# Patient Record
Sex: MALE | Employment: FULL TIME | URBAN - METROPOLITAN AREA
[De-identification: names, ages, dates, MRNs, and addresses within clinical notes are randomized per-mention and may not be internally consistent; named-entity substitution may affect disease eponyms.]

---

## 2021-01-30 LAB — SARS-COV-2: POSITIVE

## 2021-02-20 ENCOUNTER — APPOINTMENT (OUTPATIENT)
Dept: GENERAL RADIOLOGY | Age: 51
DRG: 394 | End: 2021-02-20
Payer: COMMERCIAL

## 2021-02-20 ENCOUNTER — HOSPITAL ENCOUNTER (INPATIENT)
Age: 51
LOS: 2 days | Discharge: ANOTHER HOSPITAL | DRG: 394 | End: 2021-02-23
Attending: EMERGENCY MEDICINE | Admitting: INTERNAL MEDICINE
Payer: COMMERCIAL

## 2021-02-20 DIAGNOSIS — K92.2 LOWER GI BLEED: Primary | ICD-10-CM

## 2021-02-20 DIAGNOSIS — D62 ANEMIA DUE TO ACUTE BLOOD LOSS: ICD-10-CM

## 2021-02-20 LAB
A/G RATIO: 1.7 (ref 1.1–2.2)
ALBUMIN SERPL-MCNC: 4 G/DL (ref 3.4–5)
ALP BLD-CCNC: 51 U/L (ref 40–129)
ALT SERPL-CCNC: 20 U/L (ref 10–40)
ANION GAP SERPL CALCULATED.3IONS-SCNC: 7 MMOL/L (ref 3–16)
AST SERPL-CCNC: 19 U/L (ref 15–37)
BASOPHILS ABSOLUTE: 0.1 K/UL (ref 0–0.2)
BASOPHILS RELATIVE PERCENT: 1.7 %
BILIRUB SERPL-MCNC: 0.3 MG/DL (ref 0–1)
BUN BLDV-MCNC: 17 MG/DL (ref 7–20)
CALCIUM SERPL-MCNC: 9.1 MG/DL (ref 8.3–10.6)
CHLORIDE BLD-SCNC: 106 MMOL/L (ref 99–110)
CO2: 28 MMOL/L (ref 21–32)
CREAT SERPL-MCNC: 1 MG/DL (ref 0.9–1.3)
D DIMER: <200 NG/ML DDU (ref 0–229)
EOSINOPHILS ABSOLUTE: 0.2 K/UL (ref 0–0.6)
EOSINOPHILS RELATIVE PERCENT: 3.8 %
GFR AFRICAN AMERICAN: >60
GFR NON-AFRICAN AMERICAN: >60
GLOBULIN: 2.4 G/DL
GLUCOSE BLD-MCNC: 120 MG/DL (ref 70–99)
HCT VFR BLD CALC: 22.2 % (ref 40.5–52.5)
HEMOGLOBIN: 7.5 G/DL (ref 13.5–17.5)
LYMPHOCYTES ABSOLUTE: 1.4 K/UL (ref 1–5.1)
LYMPHOCYTES RELATIVE PERCENT: 28 %
MCH RBC QN AUTO: 28.6 PG (ref 26–34)
MCHC RBC AUTO-ENTMCNC: 33.7 G/DL (ref 31–36)
MCV RBC AUTO: 84.7 FL (ref 80–100)
MONOCYTES ABSOLUTE: 0.4 K/UL (ref 0–1.3)
MONOCYTES RELATIVE PERCENT: 8.4 %
NEUTROPHILS ABSOLUTE: 2.9 K/UL (ref 1.7–7.7)
NEUTROPHILS RELATIVE PERCENT: 58.1 %
PDW BLD-RTO: 15.7 % (ref 12.4–15.4)
PLATELET # BLD: 285 K/UL (ref 135–450)
PMV BLD AUTO: 7 FL (ref 5–10.5)
POTASSIUM REFLEX MAGNESIUM: 3.8 MMOL/L (ref 3.5–5.1)
PRO-BNP: <5 PG/ML (ref 0–124)
RBC # BLD: 2.62 M/UL (ref 4.2–5.9)
SODIUM BLD-SCNC: 141 MMOL/L (ref 136–145)
TOTAL PROTEIN: 6.4 G/DL (ref 6.4–8.2)
TROPONIN: <0.01 NG/ML
WBC # BLD: 5 K/UL (ref 4–11)

## 2021-02-20 PROCEDURE — 85025 COMPLETE CBC W/AUTO DIFF WBC: CPT

## 2021-02-20 PROCEDURE — 93005 ELECTROCARDIOGRAM TRACING: CPT | Performed by: PHYSICIAN ASSISTANT

## 2021-02-20 PROCEDURE — 71045 X-RAY EXAM CHEST 1 VIEW: CPT

## 2021-02-20 PROCEDURE — 83880 ASSAY OF NATRIURETIC PEPTIDE: CPT

## 2021-02-20 PROCEDURE — 85379 FIBRIN DEGRADATION QUANT: CPT

## 2021-02-20 PROCEDURE — 80053 COMPREHEN METABOLIC PANEL: CPT

## 2021-02-20 PROCEDURE — 85610 PROTHROMBIN TIME: CPT

## 2021-02-20 PROCEDURE — 84484 ASSAY OF TROPONIN QUANT: CPT

## 2021-02-20 PROCEDURE — 85730 THROMBOPLASTIN TIME PARTIAL: CPT

## 2021-02-20 PROCEDURE — 99285 EMERGENCY DEPT VISIT HI MDM: CPT

## 2021-02-20 SDOH — HEALTH STABILITY: MENTAL HEALTH: HOW OFTEN DO YOU HAVE A DRINK CONTAINING ALCOHOL?: NEVER

## 2021-02-20 ASSESSMENT — PAIN SCALES - GENERAL: PAINLEVEL_OUTOF10: 1

## 2021-02-20 ASSESSMENT — PAIN DESCRIPTION - LOCATION: LOCATION: HEAD

## 2021-02-21 PROBLEM — K62.5 BRBPR (BRIGHT RED BLOOD PER RECTUM): Status: ACTIVE | Noted: 2021-02-21

## 2021-02-21 LAB
A/G RATIO: 1.9 (ref 1.1–2.2)
ABO/RH: NORMAL
ALBUMIN SERPL-MCNC: 4 G/DL (ref 3.4–5)
ALP BLD-CCNC: 50 U/L (ref 40–129)
ALT SERPL-CCNC: 21 U/L (ref 10–40)
ANION GAP SERPL CALCULATED.3IONS-SCNC: 6 MMOL/L (ref 3–16)
ANTIBODY SCREEN: NORMAL
APTT: 27 SEC (ref 24.2–36.2)
AST SERPL-CCNC: 19 U/L (ref 15–37)
BASOPHILS ABSOLUTE: 0 K/UL (ref 0–0.2)
BASOPHILS RELATIVE PERCENT: 0.7 %
BILIRUB SERPL-MCNC: 0.5 MG/DL (ref 0–1)
BLOOD BANK DISPENSE STATUS: NORMAL
BLOOD BANK PRODUCT CODE: NORMAL
BPU ID: NORMAL
BUN BLDV-MCNC: 15 MG/DL (ref 7–20)
CALCIUM SERPL-MCNC: 8.8 MG/DL (ref 8.3–10.6)
CHLORIDE BLD-SCNC: 111 MMOL/L (ref 99–110)
CO2: 26 MMOL/L (ref 21–32)
CREAT SERPL-MCNC: 1 MG/DL (ref 0.9–1.3)
DESCRIPTION BLOOD BANK: NORMAL
EKG ATRIAL RATE: 95 BPM
EKG DIAGNOSIS: NORMAL
EKG P AXIS: 55 DEGREES
EKG P-R INTERVAL: 150 MS
EKG Q-T INTERVAL: 354 MS
EKG QRS DURATION: 112 MS
EKG QTC CALCULATION (BAZETT): 444 MS
EKG R AXIS: -26 DEGREES
EKG T AXIS: 12 DEGREES
EKG VENTRICULAR RATE: 95 BPM
EOSINOPHILS ABSOLUTE: 0.1 K/UL (ref 0–0.6)
EOSINOPHILS RELATIVE PERCENT: 3.7 %
GFR AFRICAN AMERICAN: >60
GFR NON-AFRICAN AMERICAN: >60
GLOBULIN: 2.1 G/DL
GLUCOSE BLD-MCNC: 108 MG/DL (ref 70–99)
HCT VFR BLD CALC: 20.5 % (ref 40.5–52.5)
HCT VFR BLD CALC: 22.2 % (ref 40.5–52.5)
HCT VFR BLD CALC: 23.4 % (ref 40.5–52.5)
HEMOGLOBIN: 7 G/DL (ref 13.5–17.5)
HEMOGLOBIN: 7.4 G/DL (ref 13.5–17.5)
HEMOGLOBIN: 7.9 G/DL (ref 13.5–17.5)
INR BLD: 0.96 (ref 0.86–1.14)
LYMPHOCYTES ABSOLUTE: 1.1 K/UL (ref 1–5.1)
LYMPHOCYTES RELATIVE PERCENT: 28.3 %
MCH RBC QN AUTO: 28 PG (ref 26–34)
MCHC RBC AUTO-ENTMCNC: 33.2 G/DL (ref 31–36)
MCV RBC AUTO: 84.2 FL (ref 80–100)
MONOCYTES ABSOLUTE: 0.4 K/UL (ref 0–1.3)
MONOCYTES RELATIVE PERCENT: 9.3 %
NEUTROPHILS ABSOLUTE: 2.3 K/UL (ref 1.7–7.7)
NEUTROPHILS RELATIVE PERCENT: 58 %
PDW BLD-RTO: 15.5 % (ref 12.4–15.4)
PLATELET # BLD: 253 K/UL (ref 135–450)
PMV BLD AUTO: 6.4 FL (ref 5–10.5)
POTASSIUM REFLEX MAGNESIUM: 4.5 MMOL/L (ref 3.5–5.1)
PROTHROMBIN TIME: 11.1 SEC (ref 10–13.2)
RBC # BLD: 2.64 M/UL (ref 4.2–5.9)
SODIUM BLD-SCNC: 143 MMOL/L (ref 136–145)
TOTAL PROTEIN: 6.1 G/DL (ref 6.4–8.2)
WBC # BLD: 4 K/UL (ref 4–11)

## 2021-02-21 PROCEDURE — 86901 BLOOD TYPING SEROLOGIC RH(D): CPT

## 2021-02-21 PROCEDURE — 83540 ASSAY OF IRON: CPT

## 2021-02-21 PROCEDURE — 93010 ELECTROCARDIOGRAM REPORT: CPT | Performed by: INTERNAL MEDICINE

## 2021-02-21 PROCEDURE — 85018 HEMOGLOBIN: CPT

## 2021-02-21 PROCEDURE — 6370000000 HC RX 637 (ALT 250 FOR IP): Performed by: INTERNAL MEDICINE

## 2021-02-21 PROCEDURE — 36415 COLL VENOUS BLD VENIPUNCTURE: CPT

## 2021-02-21 PROCEDURE — 6360000002 HC RX W HCPCS: Performed by: INTERNAL MEDICINE

## 2021-02-21 PROCEDURE — 2580000003 HC RX 258: Performed by: INTERNAL MEDICINE

## 2021-02-21 PROCEDURE — 86850 RBC ANTIBODY SCREEN: CPT

## 2021-02-21 PROCEDURE — 99254 IP/OBS CNSLTJ NEW/EST MOD 60: CPT | Performed by: INTERNAL MEDICINE

## 2021-02-21 PROCEDURE — 2060000000 HC ICU INTERMEDIATE R&B

## 2021-02-21 PROCEDURE — 83550 IRON BINDING TEST: CPT

## 2021-02-21 PROCEDURE — 86900 BLOOD TYPING SEROLOGIC ABO: CPT

## 2021-02-21 PROCEDURE — 80053 COMPREHEN METABOLIC PANEL: CPT

## 2021-02-21 PROCEDURE — P9016 RBC LEUKOCYTES REDUCED: HCPCS

## 2021-02-21 PROCEDURE — 85025 COMPLETE CBC W/AUTO DIFF WBC: CPT

## 2021-02-21 PROCEDURE — 85014 HEMATOCRIT: CPT

## 2021-02-21 PROCEDURE — 86923 COMPATIBILITY TEST ELECTRIC: CPT

## 2021-02-21 PROCEDURE — 36430 TRANSFUSION BLD/BLD COMPNT: CPT

## 2021-02-21 RX ORDER — ACETAMINOPHEN 325 MG/1
650 TABLET ORAL EVERY 6 HOURS PRN
Status: DISCONTINUED | OUTPATIENT
Start: 2021-02-21 | End: 2021-02-23 | Stop reason: HOSPADM

## 2021-02-21 RX ORDER — CALCIUM CARBONATE/VITAMIN D3 600 MG-10
TABLET ORAL
COMMUNITY

## 2021-02-21 RX ORDER — SODIUM CHLORIDE 9 MG/ML
INJECTION, SOLUTION INTRAVENOUS CONTINUOUS
Status: DISCONTINUED | OUTPATIENT
Start: 2021-02-21 | End: 2021-02-23 | Stop reason: HOSPADM

## 2021-02-21 RX ORDER — MULTIVIT WITH MINERALS/LUTEIN
1000 TABLET ORAL DAILY
COMMUNITY

## 2021-02-21 RX ORDER — ACETAMINOPHEN 650 MG/1
650 SUPPOSITORY RECTAL EVERY 6 HOURS PRN
Status: DISCONTINUED | OUTPATIENT
Start: 2021-02-21 | End: 2021-02-23 | Stop reason: HOSPADM

## 2021-02-21 RX ORDER — DIAPER,BRIEF,INFANT-TODD,DISP
EACH MISCELLANEOUS 2 TIMES DAILY
Status: ON HOLD | COMMUNITY
End: 2021-02-26 | Stop reason: HOSPADM

## 2021-02-21 RX ORDER — SODIUM CHLORIDE 0.9 % (FLUSH) 0.9 %
10 SYRINGE (ML) INJECTION EVERY 12 HOURS SCHEDULED
Status: DISCONTINUED | OUTPATIENT
Start: 2021-02-21 | End: 2021-02-23 | Stop reason: HOSPADM

## 2021-02-21 RX ORDER — SODIUM CHLORIDE 0.9 % (FLUSH) 0.9 %
10 SYRINGE (ML) INJECTION PRN
Status: DISCONTINUED | OUTPATIENT
Start: 2021-02-21 | End: 2021-02-23 | Stop reason: HOSPADM

## 2021-02-21 RX ORDER — ALBUTEROL SULFATE 1.25 MG/3ML
1 SOLUTION RESPIRATORY (INHALATION) EVERY 4 HOURS PRN
COMMUNITY

## 2021-02-21 RX ORDER — M-VIT,TX,IRON,MINS/CALC/FOLIC 27MG-0.4MG
1 TABLET ORAL DAILY
COMMUNITY

## 2021-02-21 RX ORDER — OCTREOTIDE ACETATE 50 UG/ML
50 INJECTION, SOLUTION INTRAVENOUS; SUBCUTANEOUS ONCE
Status: COMPLETED | OUTPATIENT
Start: 2021-02-21 | End: 2021-02-21

## 2021-02-21 RX ORDER — ONDANSETRON 2 MG/ML
4 INJECTION INTRAMUSCULAR; INTRAVENOUS EVERY 6 HOURS PRN
Status: DISCONTINUED | OUTPATIENT
Start: 2021-02-21 | End: 2021-02-23 | Stop reason: HOSPADM

## 2021-02-21 RX ORDER — PROMETHAZINE HYDROCHLORIDE 25 MG/1
12.5 TABLET ORAL EVERY 6 HOURS PRN
Status: DISCONTINUED | OUTPATIENT
Start: 2021-02-21 | End: 2021-02-23 | Stop reason: HOSPADM

## 2021-02-21 RX ORDER — SODIUM CHLORIDE 9 MG/ML
INJECTION, SOLUTION INTRAVENOUS PRN
Status: DISCONTINUED | OUTPATIENT
Start: 2021-02-21 | End: 2021-02-22 | Stop reason: ALTCHOICE

## 2021-02-21 RX ADMIN — BISACODYL 20 MG: 5 TABLET, COATED ORAL at 12:55

## 2021-02-21 RX ADMIN — SODIUM CHLORIDE: 9 INJECTION, SOLUTION INTRAVENOUS at 16:57

## 2021-02-21 RX ADMIN — OCTREOTIDE ACETATE 50 MCG: 50 INJECTION, SOLUTION INTRAVENOUS; SUBCUTANEOUS at 04:36

## 2021-02-21 RX ADMIN — POLYETHYLENE GLYCOL 3350, SODIUM SULFATE ANHYDROUS, SODIUM BICARBONATE, SODIUM CHLORIDE, POTASSIUM CHLORIDE 4000 ML: 236; 22.74; 6.74; 5.86; 2.97 POWDER, FOR SOLUTION ORAL at 12:55

## 2021-02-21 RX ADMIN — SODIUM CHLORIDE: 9 INJECTION, SOLUTION INTRAVENOUS at 10:40

## 2021-02-21 RX ADMIN — SODIUM CHLORIDE: 9 INJECTION, SOLUTION INTRAVENOUS at 04:22

## 2021-02-21 RX ADMIN — ACETAMINOPHEN 650 MG: 325 TABLET ORAL at 13:08

## 2021-02-21 ASSESSMENT — ENCOUNTER SYMPTOMS
ANAL BLEEDING: 1
EYE PAIN: 0
SORE THROAT: 0
NAUSEA: 0
VOMITING: 0
ABDOMINAL PAIN: 0
COUGH: 0
BACK PAIN: 0
BLOOD IN STOOL: 1
SHORTNESS OF BREATH: 0

## 2021-02-21 ASSESSMENT — PAIN DESCRIPTION - LOCATION: LOCATION: HEAD

## 2021-02-21 ASSESSMENT — PAIN SCALES - GENERAL: PAINLEVEL_OUTOF10: 2

## 2021-02-21 NOTE — PROGRESS NOTES
Pt a/o. Am assessment completed see flow sheet. Pt denies any pain or other needs at this time. BM in toilet with a significant amount of bright red blood. Call light within reach. Will continue to monitor.

## 2021-02-21 NOTE — ED PROVIDER NOTES
[address] ED  EMERGENCY DEPARTMENT ENCOUNTER        Pt Name: Marva Ellington  MRN: [id number]  [place] 1970  Date of evaluation: 2/20/2021  Provider: ALICIA Barrera  PCP: No primary care provider on file. I have seen and evaluated this patient with my supervising physician Danielle Campbell MD.    [address] [place] [address]       Chief Complaint   Patient presents with    Shortness of Breath     C/O [place]. POSITIVE COVID IN [facility], OUT OF QUARANTINE. REPORTS SHORT OF BREATH WHILE WORKING OUT        HISTORY OF PRESENT ILLNESS   (Location, Timing/Onset, Context/Setting, Quality, Duration, Modifying Factors, Severity, Associated Signs and Symptoms)  Note limiting factors. ESPERANZA Martinez is a 53 y.o. male presents the emergency department for shortness of breath. On my initial evaluation of the patient, he noted that he was diagnosed with COVID-19 on January 30 after developing symptoms on January 29. Was cleared to return to work in mid February. Notes that he has been having increasing shortness of breath, decreased exercise capacity and feels short of breath with walking up 1 flight of stairs. 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 He later noted that he had a colonoscopy on January 26 for rectal bleeding, was noted to have internal hemorrhoids was evaluated by colorectal surgery and had banding procedure on January 28. He notes that one of the bands is fallen off, he continues to have rectal bleeding both with stooling and in between episodes of defecation. It is always bright red. Denies melanotic stool. Denies abdominal pain, fever, chest pain, nausea, vomiting, numbness, weakness. Nursing Notes were all reviewed and agreed with or any disagreements were addressed in the HPI. REVIEW OF SYSTEMS    (2-9 systems for level 4, 10 or more for level 5)     Review of Systems   Constitutional: Negative for fever.    HENT: Negative for sore throat. Eyes: Negative for pain and visual disturbance. Respiratory: Negative for cough and shortness of breath. Cardiovascular: Negative for chest pain. Gastrointestinal: Positive for anal bleeding and blood in stool. Negative for abdominal pain, nausea and vomiting. Genitourinary: Negative for dysuria and frequency. Musculoskeletal: Negative for back pain and neck pain. Skin: Negative for rash. Neurological: Negative for weakness, numbness and headaches. Psychiatric/Behavioral: Negative for confusion. Positives and Pertinent negatives as per HPI. Except as noted above in the ROS, all other systems were reviewed and negative. PAST MEDICAL HISTORY     Past Medical History:   Diagnosis Date    Asthma          SURGICAL HISTORY     Past Surgical History:   Procedure Laterality Date    BONY PELVIS SURGERY      HEMORRHOID SURGERY      HIP SURGERY      KNEE SURGERY           CURRENTMEDICATIONS       Previous Medications    No medications on file         ALLERGIES     Patient has no known allergies. FAMILYHISTORY     History reviewed. No pertinent family history. SOCIAL HISTORY       Social History     Tobacco Use    Smoking status: Never Smoker   Substance Use Topics    Alcohol use: Never     Frequency: Never    Drug use: Never       SCREENINGS             PHYSICAL EXAM    (up to 7 for level 4, 8 or more for level 5)     ED Triage Vitals [02/20/21 2123]   BP Temp Temp Source Pulse Resp SpO2 Height Weight   (!) 175/91 99.9 °F (37.7 °C) Oral 99 20 99 % 6' 2\" (1.88 m) 250 lb (113.4 kg)       Physical Exam  Vitals signs reviewed. Constitutional:       Appearance: He is not diaphoretic. HENT:      Nose: No congestion or rhinorrhea. Eyes:      General: No scleral icterus. Conjunctiva/sclera: Conjunctivae normal.   Neck:      Musculoskeletal: Normal range of motion and neck supple. Cardiovascular:      Rate and Rhythm: Normal rate and regular rhythm.       Pulses: Normal pulses. Heart sounds: Normal heart sounds. No murmur. No friction rub. No gallop. Pulmonary:      Effort: Pulmonary effort is normal. No respiratory distress. Breath sounds: Normal breath sounds. No stridor. No wheezing, rhonchi or rales. Abdominal:      General: There is no distension. Palpations: Abdomen is soft. Tenderness: There is no abdominal tenderness. There is no guarding or rebound. Genitourinary:     Rectum: Normal.   Musculoskeletal: Normal range of motion. General: No swelling or tenderness. Skin:     General: Skin is warm and dry. Neurological:      General: No focal deficit present. Mental Status: He is alert and oriented to person, place, and time. Sensory: No sensory deficit. Motor: No weakness.       Gait: Gait normal.   Psychiatric:         Mood and Affect: Mood normal.         Behavior: Behavior normal.         DIAGNOSTIC RESULTS   LABS:    Labs Reviewed   CBC WITH AUTO DIFFERENTIAL - Abnormal; Notable for the following components:       Result Value    RBC 2.62 (*)     Hemoglobin 7.5 (*)     Hematocrit 22.2 (*)     RDW 15.7 (*)     All other components within normal limits    Narrative:     Performed at:  [facility]) [place] [address],  [place], [facility]   Phone [phone]   COMPREHENSIVE METABOLIC PANEL W/ REFLEX TO MG FOR LOW K - Abnormal; Notable for the following components:    Glucose 120 (*)     All other components within normal limits    Narrative:     Performed at:  [place] [address],  [place], [facility]   Phone [phone]   TROPONIN    Narrative:     Performed at:  [address] [address],  [place], [facility]   Phone [phone]   D-DIMER, QUANTITATIVE    Narrative:     Performed at:  [address] [address],  [place], [facility]   Phone [phone] BRAIN NATRIURETIC PEPTIDE    Narrative:     Performed at:  [facility]  [address],  [place], [place]   Phone [phone]       All other labs were within normal range or not returned as of this dictation. EKG: All EKG's are interpreted by the Emergency Department Physician in the absence of a cardiologist.  Please see their note for interpretation of EKG. RADIOLOGY:   Non-plain film images such as CT, Ultrasound and MRI are read by the radiologist. Plain radiographic images are visualized and preliminarily interpreted by the ED Provider with the below findings:        Interpretation per the Radiologist below, if available at the time of this note:    XR CHEST PORTABLE   Final Result      No acute intrathoracic pathology. Xr Chest Portable    Result Date: 2/20/2021  EXAMINATION: ONE XRAY VIEW OF THE CHEST 2/20/2021 10:36 pm COMPARISON: None. HISTORY: ORDERING SYSTEM PROVIDED HISTORY: SOB TECHNOLOGIST PROVIDED HISTORY: Reason for exam:->SOB Reason for Exam: SOB Acuity: Acute Type of Exam: Subsequent/Follow-up Additional signs and symptoms: SOB FINDINGS: The cardiomediastinal silhouette and hilar contours are normal.  8 mm calcified granuloma within the right upper lobe. The lungs are clear with no focal consolidation, pleural effusion or pneumothorax. The overlying soft tissue and osseous structures appear unremarkable. No acute intrathoracic pathology.            PROCEDURES   Unless otherwise noted below, none     Procedures    CRITICAL CARE TIME   N/A    CONSULTS:  IP CONSULT TO HOSPITALIST  IP CONSULT TO GI      EMERGENCY DEPARTMENT COURSE and DIFFERENTIAL DIAGNOSIS/MDM:   Vitals:    Vitals:    02/20/21 2236 02/20/21 2307 02/20/21 2336 02/21/21 0006   BP: 139/80 (!) 149/83 (!) 152/84 138/83   Pulse: 92 90 92 99   Resp: 16 16 18 18   Temp:       TempSrc:       SpO2: 100% 99% 99% 99%   Weight:       Height:           Patient was given the following medications:  Medications - No data to display        50-year-old male presents the emergency department for shortness of breath. He initially spoke to me about his recent diagnosis of Covid, when his CBC returned with a hemoglobin of 7.5 he did note that he had a recent colonoscopy and diagnosis of internal hemorrhoids with continued bright red rectal bleeding. Initially ordered evaluations for PE and possible myocarditis, EKG is nonischemic, troponin is negative, normal D-dimer and BNP, low concern for PE, ACS. His anemia is certainly profound enough to explain his symptoms. Unfortunately, his most recent CBC is from 2018 we had a hemoglobin of 15. In the setting of his symptomatic anemia and continued bleeding, I do believe admission is warranted for likely repeat colonoscopy and trending of his hemoglobin. Discussed the case with hospitalist Dr. Connie Santos, who is requested a consultation to GI to be sure that they will be able to evaluate the patient as he was evaluated by outside GI and colorectal surgery providers. Consult has been placed and pending. Care signed out to Dr. Brian Stafford. FINAL IMPRESSION      1. Lower GI bleed    2. Anemia due to acute blood loss          DISPOSITION/PLAN   DISPOSITION Decision To Admit 02/20/2021 11:50:43 PM      PATIENT REFERREDTO:  No follow-up provider specified.     DISCHARGE MEDICATIONS:  New Prescriptions    No medications on file       DISCONTINUED MEDICATIONS:  Discontinued Medications    No medications on file              (Please note that portions of this note were completed with a voice recognition program.  Efforts were made to edit the dictations but occasionally words are mis-transcribed.)    ALICIA Buck (electronically signed)         ALICIA Buck  02/21/21 6924

## 2021-02-21 NOTE — ED PROVIDER NOTES
ED Attending Attestation Note    This patient was seen by the advance practice provider. I have seen and examined the patient. I agree with the workup, evaluation, management, and diagnosis. The care plan has been discussed. My assessment reveals 53 y.o. male who presents with dyspnea with exertion in setting of rectal bleeding. Recent band ligation from hemorrhoids. Also tested positive for COVID 1/29. Abdominal exam benign. Hgb 7.5 today. Unable to access CareEverywhere - patient's mychart reveals no recent H/H in several years. Given dyspnea with exertion, Hgb borderline for transfusion, will plan for admission to hospital medicine. PIV x2. Coags. EKG interpreted by myself. Rate: normal  Rhythm: NSR  Axis: normal  Intervals: within normal limits  ST Segments: no acute abnormality  T waves: no acute abnormality  Comparison: no prior  Impression: NSR with no acute abnormality       For further details of the patient's emergency department visit, please see the advanced practice provider's documentation. Laverne Calero MD     This report has been produced using speech recognition software and may contain errors related to that system including errors in grammar, punctuation, and spelling, as well as words and phrases that may be inappropriate. If there are any questions or concerns please feel free to contact the dictating provider for clarification.        Laverne Calero MD  02/20/21 5447       Laverne Calero MD  02/20/21 3133

## 2021-02-21 NOTE — PROGRESS NOTES
GI Consult has been called to Dr. Florentin Wharton on 2/21/21. Spoke with answering service.  6:00 AM    Katherine Merino  2/21/2021

## 2021-02-21 NOTE — CONSULTS
Ryan Hedley    [address] [place] [address] ,  [address] [facility]  Phone: [phone] [facility],  [address] [facility], [address] [place] [address]  Phone: [phone]    Gastroenterology H&P/Consult Note  Chief Complaint   Patient presents with    Shortness of Breath     C/O [place]. POSITIVE COVID IN [facility], OUT OF QUARANTINE. REPORTS SHORT OF BREATH WHILE WORKING OUT        HPI (location/symptom, timing/onset, duration, quality/severity, context, modifying factors, and associated signs/symptoms)     Thank you Binh Barrett PA for asking me to see Carlos Kamini SANDHU in consultation. He is a 53y.o. year old male seen independently who presents with the following GI complaints: BRBPR (bright red blood per rectum) [K62.5]. The documented principal problem and chief complaint are <principal problem not specified> Shortness of Breath (C/O DYSNPEA WITH EXERTION. POSITIVE COVID IN [facility], OUT OF QUARANTINE. REPORTS SHORT OF BREATH WHILE WORKING OUT )      Date of Admission:  2/20/2021  Date of Consultation:  2/21/2021    Mr. Mendez Eller presents with rectal bleeding following recent rectal prolapse/hemorrhoid banding at [address] [place] [address] 1/27/2021 with [place] using a [employer] hemorrhoidal banding device and colonoscopy with cecal polypectomy the proceeding day. He is also noted to have a normocytic anemic Hg 7.5 with associated LEONARD. Repeat cbc testing confirmed it is valid and not a lab error. Historically there is no documented cbc for 2.5 years before that which was normal.  He has been diagnosed with covid in the last 3 weeks or about 3 days after his procedures. He states he has been passing blood about once a day without clots. Denies rectal pain. He thinks it is all his hemorrhoids. Given dose of octreotide? There is no evidence of portal hypertension or suspicion for rectal varices. Uses occasional nsaids. Last Encounter Reviewed:   Pertinent PMH, FH, SH is reviewed below. Last EGD: none  Last Colonoscopy: 1/26/2021 cecal sessile serrated adenoma    Health Maintenance   Topic Date Due    Hepatitis C screen  1970    HIV screen  11/06/1986    Lipid screen  11/06/2011    Diabetes screen  11/06/2011    Flu vaccine (1) 09/01/2020    DTaP/Tdap/Td vaccine (2 - Td) 11/16/2022    Hepatitis A vaccine  Aged Out    Hepatitis B vaccine  Aged Out    Hib vaccine  Aged Out    Meningococcal (ACWY) vaccine  Aged Out    Pneumococcal 0-64 years Vaccine  Aged Out     PAST MEDICAL HISTORY     Past Medical History:   Diagnosis Date    Asthma     Hypertension      FAMILY HISTORY   History reviewed. No pertinent family history. SOCIAL HISTORY     Social History     Tobacco Use    Smoking status: Never Smoker   Substance Use Topics    Alcohol use: Never     Frequency: Never    Drug use: Never     SURGICAL HISTORY     Past Surgical History:   Procedure Laterality Date    BONY PELVIS SURGERY      COLONOSCOPY      polyp removal    HEMORRHOID SURGERY      HIP SURGERY      KNEE SURGERY       ALLERGIES   No Known Allergies  CURRENT MEDICATIONS      sodium chloride flush  10 mL Intravenous 2 times per day      sodium chloride 150 mL/hr at 02/21/21 0422     sodium chloride flush, promethazine **OR** ondansetron, acetaminophen **OR** acetaminophen  HOME MEDICATIONS  [unfilled]  IMMUNIZATIONS     There is no immunization history on file for this patient. REVIEW OF SYSTEMS (2-9 systems for level 4, 10 or more for level 5)   See HPI for further details and pertinent postiives. Negative for the following:  Constitutional: Negative for weight change. Negative for appetite change and fatigue. HENT: Negative for nosebleeds, sore throat, mouth sores, trouble swallowing and voice change. Respiratory: Negative for cough, choking and chest tightness.    Cardiovascular: Negative for chest pain   Gastrointestinal: No abdominal 02/20/2021    HGB 7.5 (L) 02/20/2021    HCT 22.2 (L) 02/20/2021     02/20/2021    ALT 20 02/20/2021    AST 19 02/20/2021     02/20/2021    K 3.8 02/20/2021     02/20/2021    CREATININE 1.0 02/20/2021    BUN 17 02/20/2021    CO2 28 02/20/2021    INR 0.96 02/20/2021     No results found for: BILIDIR  No results found for: PTH, CAION, PHOS  Lab Results   Component Value Date    LABALBU 4.0 02/20/2021    ALKPHOS 51 02/20/2021    ALT 20 02/20/2021    AST 19 02/20/2021    BILITOT 0.3 02/20/2021     No results found for: LIPASE  No results found for: AMYLASE  Lab Results   Component Value Date    INR 0.96 02/20/2021    PROTIME 11.1 02/20/2021     FINAL IMPRESSION/ASSESSMENT/PLAN       1. Blood loss anemia - It is not clear if this is due to hemorrhoids and typically hemorrhoids do not bleed continuously. They usually spontaneously stop in 1-2 weeks. They usually do not produce anemia. Post polypectomy bleeding from the cecal polyp or an ugi source need to be ruled out before assuming this is all hemorrhoidal bleeding. He is agreeable to egd and colonoscopy tomorrow which I will setup with Dr. Janna Corley.    1.  The patient indicates understanding of these issues and agrees with the plan. 2.  I reviewed the patient's medical information and medical history. 3.  I have reviewed the past medical, family, and social history sections including the medications and allergies listed in the above medical record. Thank you for involving [facility]) Gastroenterology in the hospital care of [facility]. For further questions or concerns, we can best be reached through perfect servAnshu Prieto 2/21/21 7:04 AM EST    [facility]) Physicians Gastroenterology   Phone [phone]   Fax [phone]

## 2021-02-21 NOTE — ED NOTES
[facility] LILIAN Veloz RN  02/21/21 1318

## 2021-02-21 NOTE — PROGRESS NOTES
Patient admitted to room 308 from ER. Patient oriented to room, call light, bed rails, phone, lights and bathroom. Patient instructed about the schedule of the day including: vital sign frequency, lab draws, possible tests, frequency of MD and staff rounds, daily weights, I &O's and prescribed diet. Patient aware of placement and reason. Telemetry box in place, patient aware of placement and reason. Bed locked, in lowest position, side rails up 2/4, call light within reach. Recliner Assessment  Patient is able to demonstrate the ability to move from a reclining position to an upright position within the recliner. 4 Eyes Skin Assessment     The patient is being assess for   Admission    I agree that 2 RN's have performed a thorough Head to Toe Skin Assessment on the patient. ALL assessment sites listed below have been assessed. Areas assessed by both nurses:   []   Head, Face, and Ears   []   Shoulders, Back, and Chest, Abdomen  []   Arms, Elbows, and Hands   []   Coccyx, Sacrum, and Ischium  []   Legs, Feet, and Heels        Per patient no skin issues,     **SHARE this note so that the co-signing nurse is able to place an eSignature**    Co-signer eSignature:     Does the Patient have Skin Breakdown?   No          Raj Prevention initiated:  No   Wound Care Orders initiated:  No      [facility] nurse consulted for Pressure Injury (Stage 3,4, Unstageable, DTI, NWPT, Complex wounds)and New or Established Ostomies:  No      Primary Nurse eSignature: Electronically signed by Cary Balderrama RN on 2/21/21 at 5:14 AM EST

## 2021-02-21 NOTE — H&P
Hospital Medicine History & Physical      PCP: No primary care provider on file. Date of Service: Pt seen/examined on 2/21/21 and admitted on 2/21/21 to Inpatient    Chief Complaint   Patient presents with    Shortness of Breath     C/O [place]. POSITIVE COVID IN [facility], OUT OF QUARANTINE. REPORTS SHORT OF BREATH WHILE WORKING OUT        History Of Present Illness: The patient is a 53 y.o. male with PMH below, presents with LEONARD, BRBPR, fatigue. Pt reports that he normally rides a bicycle several times a week for multiple miles regularly at baseline. He says now he gets winded with just a flight of steps. He has been trying to exercise but has been unable to. He reports that he had COVID, Dx at the end of last month on 1/29. He has completed quarantine. He reports that he underwent internal rectal hemorrhoid banding on 1/29 at [facility]SH. One of the bands has come out. He reports that he has frequent BRBPR both w/ BM's and in between. He denies melena and says it has only been BRBPR. He was found to have a low HGB in the ED. Past Medical History:        Diagnosis Date    Asthma        Past Surgical History:        Procedure Laterality Date    BONY PELVIS SURGERY      HEMORRHOID SURGERY      HIP SURGERY      KNEE SURGERY         Medications Prior to Admission:    none    Allergies:  Patient has no known allergies. Social History:    TOBACCO:   reports that he has never smoked. He does not have any smokeless tobacco history on file. ETOH:   reports no history of alcohol use. Family History:  Reviewed in detail and negative for DM, Early CAD, Cancer (except as below). Positive as follows:    History reviewed. No pertinent family history.     REVIEW OF SYSTEMS:   Pertinent positives/negatives as follows: BRBPR, fatigue, and as discussed in HPI, otherwise a complete ROS performed and all other systems are negative  PHYSICAL EXAM PERFORMED:    BP (!) 155/84   Pulse 88   Temp 99.9 °F (37.7 °C) (Oral)   Resp 16   Ht 6' 2\" (1.88 m)   Wt 250 lb (113.4 kg)   SpO2 100%   BMI 32.10 kg/m²     GEN:  A&Ox3, NAD. Appears pale. HEENT:  NC/AT,EOMI, MMM, no erythema/exudates or visible masses. CVS:  Normal S1,S2. RRR. Without M/G/R.   LUNG:   CTA-B. no wheezes, rales or rhonchi  ABD:  Soft, ND/NT, BS+ x4. Without G/R.  EXT: 2+ pulses, no c/c/e. Brisk cap refill. PSY:  Thought process intact, affect appropriate. DENITA:  CN III-XII intact, moves all 4 spontaneously, sensory grossly intact. SKIN: No rash or lesions on visible skin. Result Date: 2/20/2021  EXAMINATION: ONE XRAY VIEW OF THE CHEST 2/20/2021 10:36 pm COMPARISON: None. HISTORY: ORDERING SYSTEM PROVIDED HISTORY: SOB TECHNOLOGIST PROVIDED HISTORY: Reason for exam:->SOB Reason for Exam: SOB Acuity: Acute Type of Exam: Subsequent/Follow-up Additional signs and symptoms: SOB FINDINGS: The cardiomediastinal silhouette and hilar contours are normal.  8 mm calcified granuloma within the right upper lobe. The lungs are clear with no focal consolidation, pleural effusion or pneumothorax. The overlying soft tissue and osseous structures appear unremarkable. No acute intrathoracic pathology.      EKG 12 Lead [[id number]]    Collected: 02/20/21 2218    Updated: 02/20/21 2220     Ventricular Rate 95 BPM    Atrial Rate 95 BPM    P-R Interval 150 ms    QRS Duration 112 ms    Q-T Interval 354 ms    QTc Calculation (Bazett) 444 ms    P Axis 55 degrees    R Axis -26 degrees    T Axis 12 degrees    Diagnosis Normal sinus rhythmNormal ECGNo previous ECGs available     CBC:  Recent Labs     02/20/21 2217   WBC 5.0   HGB 7.5*   HCT 22.2*         RENAL  Recent Labs     02/20/21 2217      K 3.8      CO2 28   BUN 17   CREATININE 1.0   GLUCOSE 120*     LFT'S:  Recent Labs     02/20/21 2217   AST 19   ALT 20   BILITOT 0.3   ALKPHOS 51       COAG:  Recent Labs     02/20/21 2217   INR 0.96     CARDIAC ENZYMES:   Recent Labs 02/20/21  2217   TROPONINI <0.01     Lab Results   Component Value Date    PROBNP <5 02/20/2021     COVID  1/29/2021   Nasopharynx   OUTPATIENT . .. POSITIVE (AA)       PHYSICIAN CERTIFICATION  I certify that ESPERANZA Galvan is expected to be hospitalized for 2 midnights based on the following assessment and plan:    ASSESSMENT/PLAN:  1. GI bleed, HGB currently 7.5 no recent previous available for comparison but was 15.1 in 10/2018 per CE. Will plan to transfuse for HGB < 7.0. Type and screen. Hx of internal hemorrhoids so will give a dose of octreotide. Pt had internal hemorrhoid banding on 1/29/21 and has been bleeding since. H&H q6h, GI c/s. Admit to tele. 2. Acute blood loss anemia, symptomatic, as above. 3. COVID + on 1/29/21. Likely no longer contagious. Will keep in droplet + for now. DVT Prophylaxis: SCD  Diet: NPO  Code Status: Full Code  PT/OT Eval Status: Will order if needed and as patient condition allows  Dispo - Admit to inpatient PCU    Shilpa Bynum MD    Thank you No primary care provider on file. for the opportunity to be involved in this patient's care. If you have any questions or concerns please feel free to contact me via the [employer] at [phone]. This chart was generated using the [address] [place] [address] [address] dictation system. I created this record but it may contain dictation errors given the limitations of this technology.

## 2021-02-21 NOTE — PROGRESS NOTES
Per MD since it has been 23 days since pt's positive covid test and he is not having any covid related symptoms, droplet plus isolation is not needed.

## 2021-02-21 NOTE — ED NOTES
Dr. Dania Torres returned page at [address] [facility] and spoke to Dr. Dat Zaragoza.       Calos Shaffer  82/91/08 [id number]

## 2021-02-22 ENCOUNTER — ANESTHESIA (OUTPATIENT)
Dept: ENDOSCOPY | Age: 51
DRG: 394 | End: 2021-02-22
Payer: COMMERCIAL

## 2021-02-22 ENCOUNTER — ANESTHESIA EVENT (OUTPATIENT)
Dept: ENDOSCOPY | Age: 51
DRG: 394 | End: 2021-02-22
Payer: COMMERCIAL

## 2021-02-22 VITALS
RESPIRATION RATE: 20 BRPM | OXYGEN SATURATION: 99 % | DIASTOLIC BLOOD PRESSURE: 46 MMHG | SYSTOLIC BLOOD PRESSURE: 99 MMHG

## 2021-02-22 LAB
BLOOD BANK DISPENSE STATUS: NORMAL
BLOOD BANK DISPENSE STATUS: NORMAL
BLOOD BANK PRODUCT CODE: NORMAL
BLOOD BANK PRODUCT CODE: NORMAL
BPU ID: NORMAL
BPU ID: NORMAL
DESCRIPTION BLOOD BANK: NORMAL
DESCRIPTION BLOOD BANK: NORMAL
HCT VFR BLD CALC: 19 % (ref 40.5–52.5)
HCT VFR BLD CALC: 20.9 % (ref 40.5–52.5)
HCT VFR BLD CALC: 21.9 % (ref 40.5–52.5)
HCT VFR BLD CALC: 22.6 % (ref 40.5–52.5)
HEMOGLOBIN: 6.3 G/DL (ref 13.5–17.5)
HEMOGLOBIN: 7 G/DL (ref 13.5–17.5)
HEMOGLOBIN: 7.1 G/DL (ref 13.5–17.5)
HEMOGLOBIN: 7.6 G/DL (ref 13.5–17.5)
IRON SATURATION: 7 % (ref 20–50)
IRON: 22 UG/DL (ref 59–158)
TOTAL IRON BINDING CAPACITY: 335 UG/DL (ref 260–445)

## 2021-02-22 PROCEDURE — 0DJ08ZZ INSPECTION OF UPPER INTESTINAL TRACT, VIA NATURAL OR ARTIFICIAL OPENING ENDOSCOPIC: ICD-10-PCS | Performed by: INTERNAL MEDICINE

## 2021-02-22 PROCEDURE — 3609009900 HC COLONOSCOPY W/CONTROL BLEEDING ANY METHOD: Performed by: INTERNAL MEDICINE

## 2021-02-22 PROCEDURE — 2580000003 HC RX 258: Performed by: INTERNAL MEDICINE

## 2021-02-22 PROCEDURE — 7100000010 HC PHASE II RECOVERY - FIRST 15 MIN: Performed by: INTERNAL MEDICINE

## 2021-02-22 PROCEDURE — 3700000001 HC ADD 15 MINUTES (ANESTHESIA): Performed by: INTERNAL MEDICINE

## 2021-02-22 PROCEDURE — 3609010600 HC COLONOSCOPY POLYPECTOMY SNARE/COLD BIOPSY: Performed by: INTERNAL MEDICINE

## 2021-02-22 PROCEDURE — P9016 RBC LEUKOCYTES REDUCED: HCPCS

## 2021-02-22 PROCEDURE — 0DBN8ZX EXCISION OF SIGMOID COLON, VIA NATURAL OR ARTIFICIAL OPENING ENDOSCOPIC, DIAGNOSTIC: ICD-10-PCS | Performed by: INTERNAL MEDICINE

## 2021-02-22 PROCEDURE — 2720000010 HC SURG SUPPLY STERILE: Performed by: INTERNAL MEDICINE

## 2021-02-22 PROCEDURE — 85018 HEMOGLOBIN: CPT

## 2021-02-22 PROCEDURE — 6360000002 HC RX W HCPCS: Performed by: NURSE ANESTHETIST, CERTIFIED REGISTERED

## 2021-02-22 PROCEDURE — 3609017100 HC EGD: Performed by: INTERNAL MEDICINE

## 2021-02-22 PROCEDURE — 99232 SBSQ HOSP IP/OBS MODERATE 35: CPT | Performed by: INTERNAL MEDICINE

## 2021-02-22 PROCEDURE — 6360000002 HC RX W HCPCS: Performed by: NURSE PRACTITIONER

## 2021-02-22 PROCEDURE — 36430 TRANSFUSION BLD/BLD COMPNT: CPT

## 2021-02-22 PROCEDURE — 3700000000 HC ANESTHESIA ATTENDED CARE: Performed by: INTERNAL MEDICINE

## 2021-02-22 PROCEDURE — 2580000003 HC RX 258: Performed by: NURSE ANESTHETIST, CERTIFIED REGISTERED

## 2021-02-22 PROCEDURE — 2580000003 HC RX 258

## 2021-02-22 PROCEDURE — 7100000011 HC PHASE II RECOVERY - ADDTL 15 MIN: Performed by: INTERNAL MEDICINE

## 2021-02-22 PROCEDURE — 85014 HEMATOCRIT: CPT

## 2021-02-22 PROCEDURE — C9113 INJ PANTOPRAZOLE SODIUM, VIA: HCPCS | Performed by: NURSE PRACTITIONER

## 2021-02-22 PROCEDURE — 88305 TISSUE EXAM BY PATHOLOGIST: CPT

## 2021-02-22 PROCEDURE — 2709999900 HC NON-CHARGEABLE SUPPLY: Performed by: INTERNAL MEDICINE

## 2021-02-22 PROCEDURE — 36415 COLL VENOUS BLD VENIPUNCTURE: CPT

## 2021-02-22 PROCEDURE — 2060000000 HC ICU INTERMEDIATE R&B

## 2021-02-22 PROCEDURE — 43235 EGD DIAGNOSTIC BRUSH WASH: CPT | Performed by: INTERNAL MEDICINE

## 2021-02-22 PROCEDURE — 45382 COLONOSCOPY W/CONTROL BLEED: CPT | Performed by: INTERNAL MEDICINE

## 2021-02-22 RX ORDER — SODIUM CHLORIDE, SODIUM LACTATE, POTASSIUM CHLORIDE, CALCIUM CHLORIDE 600; 310; 30; 20 MG/100ML; MG/100ML; MG/100ML; MG/100ML
INJECTION, SOLUTION INTRAVENOUS CONTINUOUS
Status: CANCELLED | OUTPATIENT
Start: 2021-02-22

## 2021-02-22 RX ORDER — SODIUM CHLORIDE 9 MG/ML
INJECTION, SOLUTION INTRAVENOUS
Status: COMPLETED
Start: 2021-02-22 | End: 2021-02-22

## 2021-02-22 RX ORDER — MORPHINE SULFATE 10 MG/ML
1 INJECTION, SOLUTION INTRAMUSCULAR; INTRAVENOUS EVERY 5 MIN PRN
Status: DISCONTINUED | OUTPATIENT
Start: 2021-02-22 | End: 2021-02-23 | Stop reason: HOSPADM

## 2021-02-22 RX ORDER — SODIUM CHLORIDE 9 MG/ML
INJECTION, SOLUTION INTRAVENOUS PRN
Status: DISCONTINUED | OUTPATIENT
Start: 2021-02-22 | End: 2021-02-23 | Stop reason: HOSPADM

## 2021-02-22 RX ORDER — ONDANSETRON 2 MG/ML
4 INJECTION INTRAMUSCULAR; INTRAVENOUS
Status: ACTIVE | OUTPATIENT
Start: 2021-02-22 | End: 2021-02-22

## 2021-02-22 RX ORDER — SODIUM CHLORIDE 0.9 % (FLUSH) 0.9 %
10 SYRINGE (ML) INJECTION PRN
Status: CANCELLED | OUTPATIENT
Start: 2021-02-22

## 2021-02-22 RX ORDER — SODIUM CHLORIDE 9 MG/ML
INJECTION, SOLUTION INTRAVENOUS PRN
Status: DISCONTINUED | OUTPATIENT
Start: 2021-02-22 | End: 2021-02-22 | Stop reason: ALTCHOICE

## 2021-02-22 RX ORDER — SODIUM CHLORIDE 0.9 % (FLUSH) 0.9 %
10 SYRINGE (ML) INJECTION EVERY 12 HOURS SCHEDULED
Status: CANCELLED | OUTPATIENT
Start: 2021-02-22

## 2021-02-22 RX ORDER — SODIUM CHLORIDE 9 MG/ML
INJECTION, SOLUTION INTRAVENOUS CONTINUOUS PRN
Status: DISCONTINUED | OUTPATIENT
Start: 2021-02-22 | End: 2021-02-22 | Stop reason: SDUPTHER

## 2021-02-22 RX ORDER — OXYCODONE HYDROCHLORIDE AND ACETAMINOPHEN 5; 325 MG/1; MG/1
1 TABLET ORAL PRN
Status: ACTIVE | OUTPATIENT
Start: 2021-02-22 | End: 2021-02-22

## 2021-02-22 RX ORDER — OXYCODONE HYDROCHLORIDE AND ACETAMINOPHEN 5; 325 MG/1; MG/1
2 TABLET ORAL PRN
Status: ACTIVE | OUTPATIENT
Start: 2021-02-22 | End: 2021-02-22

## 2021-02-22 RX ORDER — PROPOFOL 10 MG/ML
INJECTION, EMULSION INTRAVENOUS PRN
Status: DISCONTINUED | OUTPATIENT
Start: 2021-02-22 | End: 2021-02-22 | Stop reason: SDUPTHER

## 2021-02-22 RX ORDER — MEPERIDINE HYDROCHLORIDE 25 MG/ML
12.5 INJECTION INTRAMUSCULAR; INTRAVENOUS; SUBCUTANEOUS EVERY 5 MIN PRN
Status: DISCONTINUED | OUTPATIENT
Start: 2021-02-22 | End: 2021-02-23 | Stop reason: HOSPADM

## 2021-02-22 RX ORDER — PANTOPRAZOLE SODIUM 40 MG/10ML
40 INJECTION, POWDER, LYOPHILIZED, FOR SOLUTION INTRAVENOUS 2 TIMES DAILY
Status: DISCONTINUED | OUTPATIENT
Start: 2021-02-22 | End: 2021-02-23 | Stop reason: HOSPADM

## 2021-02-22 RX ORDER — MORPHINE SULFATE 10 MG/ML
2 INJECTION, SOLUTION INTRAMUSCULAR; INTRAVENOUS EVERY 5 MIN PRN
Status: DISCONTINUED | OUTPATIENT
Start: 2021-02-22 | End: 2021-02-23 | Stop reason: HOSPADM

## 2021-02-22 RX ADMIN — PROPOFOL 50 MG: 10 INJECTION, EMULSION INTRAVENOUS at 13:53

## 2021-02-22 RX ADMIN — PROPOFOL 50 MG: 10 INJECTION, EMULSION INTRAVENOUS at 14:05

## 2021-02-22 RX ADMIN — PROPOFOL 50 MG: 10 INJECTION, EMULSION INTRAVENOUS at 13:49

## 2021-02-22 RX ADMIN — Medication 10 ML: at 20:42

## 2021-02-22 RX ADMIN — SODIUM CHLORIDE: 9 INJECTION, SOLUTION INTRAVENOUS at 04:30

## 2021-02-22 RX ADMIN — PROPOFOL 50 MG: 10 INJECTION, EMULSION INTRAVENOUS at 13:46

## 2021-02-22 RX ADMIN — Medication 10 ML: at 09:22

## 2021-02-22 RX ADMIN — PROPOFOL 40 MG: 10 INJECTION, EMULSION INTRAVENOUS at 13:44

## 2021-02-22 RX ADMIN — PROPOFOL 50 MG: 10 INJECTION, EMULSION INTRAVENOUS at 14:01

## 2021-02-22 RX ADMIN — PANTOPRAZOLE SODIUM 40 MG: 40 INJECTION, POWDER, FOR SOLUTION INTRAVENOUS at 14:32

## 2021-02-22 RX ADMIN — PROPOFOL 50 MG: 10 INJECTION, EMULSION INTRAVENOUS at 13:57

## 2021-02-22 RX ADMIN — PROPOFOL 80 MG: 10 INJECTION, EMULSION INTRAVENOUS at 13:42

## 2021-02-22 RX ADMIN — PROPOFOL 80 MG: 10 INJECTION, EMULSION INTRAVENOUS at 13:40

## 2021-02-22 RX ADMIN — SODIUM CHLORIDE: 9 INJECTION, SOLUTION INTRAVENOUS at 19:13

## 2021-02-22 RX ADMIN — PANTOPRAZOLE SODIUM 40 MG: 40 INJECTION, POWDER, FOR SOLUTION INTRAVENOUS at 20:41

## 2021-02-22 RX ADMIN — PROPOFOL 50 MG: 10 INJECTION, EMULSION INTRAVENOUS at 14:10

## 2021-02-22 RX ADMIN — SODIUM CHLORIDE: 9 INJECTION, SOLUTION INTRAVENOUS at 13:33

## 2021-02-22 RX ADMIN — PROPOFOL 50 MG: 10 INJECTION, EMULSION INTRAVENOUS at 14:14

## 2021-02-22 ASSESSMENT — ENCOUNTER SYMPTOMS: SHORTNESS OF BREATH: 0

## 2021-02-22 ASSESSMENT — PAIN - FUNCTIONAL ASSESSMENT: PAIN_FUNCTIONAL_ASSESSMENT: 0-10

## 2021-02-22 ASSESSMENT — LIFESTYLE VARIABLES: SMOKING_STATUS: 0

## 2021-02-22 NOTE — ANESTHESIA POSTPROCEDURE EVALUATION
Department of Anesthesiology  Postprocedure Note    Patient: Katlin Tena  MRN: [id number]  YOB: 1970  Date of evaluation: 2/22/2021  Time:  2:30 PM     Procedure Summary     Date: 02/22/21 Room / Location: [address] [place] [address] / [facility]    Anesthesia Start: 1333 Anesthesia Stop: 1416    Procedures:       EGD W/ANES. (13:30) + COVID 1/29 (N/A )      COLONOSCOPY POLYPECTOMY SNARE/COLD BIOPSY (N/A )      COLONOSCOPY CONTROL HEMORRHAGE (N/A ) Diagnosis: (?)    Surgeons: Romario Walton MD Responsible Provider: Katelyn Silva MD    Anesthesia Type: TIVA ASA Status: 2          Anesthesia Type: TIVA    Ramon Phase I: Ramon Score: 10    Ramon Phase II:      Last vitals: Reviewed and per EMR flowsheets.      Vitals:    02/22/21 0620 02/22/21 0925 02/22/21 1140 02/22/21 1236   BP: (!) 152/80 (!) 154/89  (!) 161/89   Pulse: 91 103 99 100   Resp: 16 17  16   Temp: 98.7 °F (37.1 °C) 99.4 °F (37.4 °C)  98.4 °F (36.9 °C)   TempSrc: Oral Oral  Temporal   SpO2: 98% 99%  97%   Weight:       Height:         Anesthesia Post Evaluation    Patient location during evaluation: bedside  Patient participation: complete - patient participated  Level of consciousness: awake and alert  Airway patency: patent  Nausea & Vomiting: no nausea  Complications: no  Cardiovascular status: hemodynamically stable  Respiratory status: acceptable  Hydration status: euvolemic

## 2021-02-22 NOTE — PROGRESS NOTES
Shift assessment completed, see flow sheet. A/0x4, able to express needs and no c/o pain. Currently drinking bowel prep. 1 U PRBC finished, recheck H/H 7.9/23. 4. x1 Bloody BM documented. Pt educated on up coming procedure and NPO status at midnight. Pt verbalizes understanding. In bed with call light in reach.

## 2021-02-22 NOTE — OP NOTE
[address] ,  [address] [facility]  Phone: [phone]    Colonoscopy and EGD Procedure Note    Patient: Mallorie Hanna II  : 1970    Procedure: Colonoscopy and EGD    Date:  2021     Endoscopist:  Holger Bateman MD    Referring Physician:  No primary care provider on file. Preoperative Diagnosis:  BRBPR (bright red blood per rectum) [K62.5]    Postoperative Diagnosis:  See impression    Anesthesia: Anesthesia: MAC  Sedation: see anesthesia notes for details. Colonoscopy withdrawal time: >6 minutes  ASA Class: 3  Mallampati: II (soft palate, uvula, fauces visible)    Indications: This is a 53y.o. year old male who presents today with hematochezia for EGD and colonoscopy, see HPI for details. EGD    Procedure Details  Informed consent was obtained for the procedure, including conscious sedation. Risks of pancreatitis, infection, perforation, hemorrhage, adverse drug reaction and aspiration were discussed. The patient was placed in the left lateral decubitus position. Based on the pre-procedure assessment, including review of the patient's medical history, medications, allergies, and review of systems, he had been deemed to be an appropriate candidate for conscious sedation; he was therefore sedated with the medications listed above. He was monitored continuously with ECG tracing, pulse oximetry, blood pressure monitoring, and direct observation. A gastroscope was inserted into the mouth and advanced under direct vision to second portion of the duodenum. A careful inspection was made as the gastroscope was withdrawn, including a retroflexed view of the proximal stomach; findings and interventions are described below. Appropriate photodocumentation was obtained. If photos taken, they were ordered to be scanned into the medical record.     Findings: -The esophagus is normal. Normal stomach and duodenum to the second portion, there was no blood in the UGI tract. Specimens: Was Not Obtained    Complications: None    Estimated blood loss: minimal    Colonoscopy    Procedure Details  Informed consent was obtained for the procedure, including sedation. Risks of perforation, hemorrhage, adverse drug reaction and aspiration were discussed. The patient was placed in the left lateral decubitus position. Based on the pre-procedure assessment, including review of the patient's medical history, medications, allergies, and review of systems, he had been deemed to be an appropriate candidate for conscious sedation; he was therefore sedated with the medications listed below. The patient was monitored continuously with ECG tracing, pulse oximetry, blood pressure monitoring, and direct observations. rectal examination was performed. The colonoscope was inserted into the rectum and advanced under direct vision to the terminal ileum. The quality of the colonic preparation was good. A careful inspection was made as the colonoscope was withdrawn, including a retroflexed view of the rectum; findings and interventions are described below. Appropriate photodocumentation was obtained. Patient tolerated the procedure well. Findings: - There were medium sized friable hemorrhoids with 2 ulcerated areas at the dentate line/distal rectum. There was NO blood in the entire colon (yellow fluid). Scope was advanced to the terminal ileum which appears normal with yellow-green fluid. One 8 mm pedunculated sigmoid polyp removed with hot snare. No other clear areas to suggest bleeding source except the recent hemorrhoid banding related ulcers so these were sprayed with hemospray. - Anesthesia issues: no    Specimens: Was Not Obtained    Complications:   None    Estimated blood loss: minimal    Disposition:   PACU - hemodynamically stable.     Impression:   - EGD - The esophagus is normal. Normal stomach and duodenum to the second portion, there was no blood in the UGI tract. - Colonoscopy - There were medium sized friable hemorrhoids with 2 ulcerated areas at the dentate line/distal rectum. There was NO blood in the entire colon (yellow fluid). Scope was advanced to the terminal ileum which appears normal with yellow-green fluid. One 8 mm pedunculated sigmoid polyp removed with hot snare. No other clear areas to suggest bleeding source except the recent hemorrhoid banding related ulcers so these were sprayed with hemospray. Recommendations:  - Await pathology   - Perhaps the bleeding was related to the hemorrhoid banding related ulcers,these were treated. No other clear source identified on EGD and colonoscopy. The level of hemoglobin drop is significant and not commonly associated with hemorrhoidal banding related bleeding, for this reason would recommend outpatient capsule endoscopy to rule out a small bowel source  Advance diet  Watch Hb and keep >7  Can discharge tomorrow with outpatient followup.   Consider IV iron        Aniket Mallory 2/22/21 2:34 PM EST

## 2021-02-22 NOTE — ANESTHESIA PRE PROCEDURE
HGB 7.6 02/22/2021    HCT 22.6 02/22/2021    MCV 84.2 02/21/2021    RDW 15.5 02/21/2021     02/21/2021       CMP:   Lab Results   Component Value Date     02/21/2021    K 4.5 02/21/2021     02/21/2021    CO2 26 02/21/2021    BUN 15 02/21/2021    CREATININE 1.0 02/21/2021    GFRAA >60 02/21/2021    AGRATIO 1.9 02/21/2021    LABGLOM >60 02/21/2021    GLUCOSE 108 02/21/2021    PROT 6.1 02/21/2021    CALCIUM 8.8 02/21/2021    BILITOT 0.5 02/21/2021    ALKPHOS 50 02/21/2021    AST 19 02/21/2021    ALT 21 02/21/2021       POC Tests: No results for input(s): POCGLU, POCNA, POCK, POCCL, POCBUN, POCHEMO, POCHCT in the last 72 hours. Coags:   Lab Results   Component Value Date    PROTIME 11.1 02/20/2021    INR 0.96 02/20/2021    APTT 27.0 02/20/2021       HCG (If Applicable): No results found for: PREGTESTUR, PREGSERUM, HCG, HCGQUANT     ABGs: No results found for: PHART, PO2ART, OOY8JSX, AOQ1SVU, BEART, Z6ACZSLH     Type & Screen (If Applicable):  No results found for: LABABO, LABRH    Drug/Infectious Status (If Applicable):  No results found for: HIV, HEPCAB    COVID-19 Screening (If Applicable):   Lab Results   Component Value Date    COVID19 POSITIVE 01/29/2021         Anesthesia Evaluation  Patient summary reviewed no history of anesthetic complications:   Airway: Mallampati: II  TM distance: >3 FB   Neck ROM: full  Mouth opening: > = 3 FB Dental:          Pulmonary: breath sounds clear to auscultation  (+) asthma (rare prn inhaler):     (-) COPD, shortness of breath, recent URI, sleep apnea and not a current smoker          Patient did not smoke on day of surgery.                  Cardiovascular:    (+) hypertension (no meds current):,     (-) pacemaker, past MI, CAD, CABG/stent, dysrhythmias,  angina,  CHF and orthopnea    ECG reviewed  Rhythm: regular  Rate: normal           Beta Blocker:  Not on Beta Blocker         Neuro/Psych:   Negative Neuro/Psych ROS (-) seizures, TIA, CVA and psychiatric history           GI/Hepatic/Renal:   (+) bowel prep,      (-) GERD, no renal disease and no morbid obesity       Endo/Other:    (+) blood dyscrasia: anemia:., no malignancy/cancer. (-) diabetes mellitus, hypothyroidism, hyperthyroidism, arthritis, no malignancy/cancer               Abdominal:           Vascular:                                        Anesthesia Plan      TIVA     ASA 2       Induction: intravenous. MIPS: Prophylactic antiemetics administered. Anesthetic plan and risks discussed with patient. Plan discussed with CRNA. This pre-anesthesia assessment may be used as a history and physical.    DOS STAFF ADDENDUM:    Pt seen and examined, chart reviewed (including anesthesia, drug and allergy history). No interval changes to history and physical examination. Anesthetic plan, risks, benefits, alternatives, and personnel involved discussed with patient. Patient verbalized an understanding and agrees to proceed.       Micheline Osborne MD  February 22, 2021  12:56 PM          Micheline Osborne MD   2/22/2021

## 2021-02-22 NOTE — PROGRESS NOTES
Transport here to take pt back to room. Pt in stable condition at this time. Denies pain or any needs.

## 2021-02-22 NOTE — H&P
Gastroenterology H&P/Consult Note       Chief Complaint   Patient presents with    Shortness of Breath       C/O [place]YSNPEA WITH EXERTION. POSITIVE COVID IN [facility], OUT OF QUARANTINE. REPORTS SHORT OF BREATH WHILE WORKING OUT         Mr. Chiquis Lofton presents with rectal bleeding following recent rectal prolapse/hemorrhoid banding at [facility] 1/27/2021 with Christoph Lopez using a [place] hemorrhoidal banding device and colonoscopy with cecal polypectomy the proceeding day. He is also noted to have a normocytic anemic Hg 7.5 with associated LEONARD. Repeat cbc testing confirmed it is valid and not a lab error. Historically there is no documented cbc for 2.5 years before that which was normal.  He has been diagnosed with covid in the last 3 weeks or about 3 days after his procedures. He states he has been passing blood about once a day without clots. Denies rectal pain. He thinks it is all his hemorrhoids. There is no evidence of portal hypertension or suspicion for rectal varices. Uses occasional nsaids.       Last Encounter Reviewed:   Pertinent PMH, FH, SH is reviewed below. Last EGD: none  Last Colonoscopy: 1/26/2021 cecal sessile serrated adenoma          Health Maintenance   Topic Date Due    Hepatitis C screen  1970    HIV screen  11/06/1986    Lipid screen  11/06/2011    Diabetes screen  11/06/2011    Flu vaccine (1) 09/01/2020    DTaP/Tdap/Td vaccine (2 - Td) 11/16/2022    Hepatitis A vaccine  Aged Out    Hepatitis B vaccine  Aged Out    Hib vaccine  Aged Out    Meningococcal (ACWY) vaccine  Aged Out    Pneumococcal 0-64 years Vaccine  Aged Out      PAST MEDICAL HISTORY      Past Medical History        Past Medical History:   Diagnosis Date    Asthma      Hypertension           FAMILY HISTORY   Family History   History reviewed. No pertinent family history.      SOCIAL HISTORY      Social History            Tobacco Use    Smoking status: Never Smoker   Substance Use Topics    Alcohol use: Never       Frequency: Never    Drug use: Never      SURGICAL HISTORY      Past Surgical History         Past Surgical History:   Procedure Laterality Date    BONY PELVIS SURGERY        COLONOSCOPY         polyp removal    HEMORRHOID SURGERY        HIP SURGERY        KNEE SURGERY             ALLERGIES   No Known Allergies  CURRENT MEDICATIONS      Scheduled Medications    sodium chloride flush  10 mL Intravenous 2 times per day         Infusions Meds    sodium chloride 150 mL/hr at 02/21/21 0422         PRN Medications   sodium chloride flush, promethazine **OR** ondansetron, acetaminophen **OR** acetaminophen     HOME MEDICATIONS  [unfilled]  IMMUNIZATIONS      There is no immunization history on file for this patient. REVIEW OF SYSTEMS (2-9 systems for level 4, 10 or more for level 5)   See HPI for further details and pertinent postiives. Negative for the following:  Constitutional: Negative for weight change. Negative for appetite change and fatigue. HENT: Negative for nosebleeds, sore throat, mouth sores, trouble swallowing and voice change. Respiratory: Negative for cough, choking and chest tightness. Cardiovascular: Negative for chest pain   Gastrointestinal: No abdominal pain, heartburn, bloating, dysphagia, cough, chest pain, globus, regurgitation, diarrhea, constipation, nausea, or vomiting. Positive for blood in stool, heard. Musculoskeletal: Negative for arthralgias. Skin: Negative for pallor. Neurological: Negative for weakness and light-headedness. Hematological: Negative for adenopathy. Does not bruise/bleed easily. Psychiatric/Behavioral: Negative for suicidal ideas. PHYSICAL EXAM (7 for level 4, 8 or more for level 5)   VITAL SIGNS: BP (!) 164/90   Pulse 91   Temp 99.2 °F (37.3 °C) (Oral)   Resp 16   Ht 6' 2\" (1.88 m)   Wt 252 lb 11.2 oz (114.6 kg)   SpO2 98%   BMI 32.44 kg/m²   With regards to weight, he reports stable / unchanged.   Review of available records reveals: Wt Readings from Last 50 Encounters:   02/21/21 252 lb 11.2 oz (114.6 kg)      Constitutional: Well developed, Well nourished, No acute distress, Non-toxic appearance. HENT: Normocephalic, Atraumatic, Bilateral external ears normal, Oropharynx moist, No oral exudates, Nose normal.   Eyes: Conjunctiva normal, No discharge. Neck: Normal range of motion, No tenderness, Supple, No stridor. Lymphatic: No lymphadenopathy noted. Cardiovascular: Normal heart rate, Normal rhythm, No murmurs, No rubs, No gallops. Thorax & Lungs: Normal breath sounds, No respiratory distress, No wheezing, No chest tenderness. Abdomen: scars consistent with stated surgeries,  Soft NTND  Rectal:  Deferred. Skin: Warm, Dry, No erythema, No rash. Back: No tenderness, No CVA tenderness. Extremities: Intact distal pulses, No edema, No tenderness, No cyanosis, No clubbing.    Neurologic: Alert & oriented x 3, Normal motor function, Normal sensory function, No focal deficits noted.      RADIOLOGY/PROCEDURES         COURSE & MEDICAL DECISION MAKING   (See epic chart for additional details including stool tests, total bilirubin, viral loads, procedures, and pathology)        Lab Results   Component Value Date     WBC 5.0 02/20/2021     HGB 7.5 (L) 02/20/2021     HCT 22.2 (L) 02/20/2021      02/20/2021     ALT 20 02/20/2021     AST 19 02/20/2021      02/20/2021     K 3.8 02/20/2021      02/20/2021     CREATININE 1.0 02/20/2021     BUN 17 02/20/2021     CO2 28 02/20/2021     INR 0.96 02/20/2021      No results found for: BILIDIR  No results found for: PTH, CAION, PHOS        Lab Results   Component Value Date     LABALBU 4.0 02/20/2021     ALKPHOS 51 02/20/2021     ALT 20 02/20/2021     AST 19 02/20/2021     BILITOT 0.3 02/20/2021      No results found for: LIPASE  No results found for: AMYLASE        Lab Results   Component Value Date     INR 0.96 02/20/2021     PROTIME 11.1 02/20/2021      FINAL IMPRESSION/ASSESSMENT/PLAN      Hematochezia and anemia due to blood loss. Cause is not clear. Differential includes bleeding related to recent hemorrhoid banding vs other causes such as PUD/vascular lesions. It is unusual to have postpolypectomy bleeding from cold snare polypectomy so many weeks after the colonoscopy. Needs EGD and colonoscopy now  Procedure discussed with patient in detail including risks, benefits and alternatives. Anesthesia risks, risk of perforation, bleeding discussed with the patient.

## 2021-02-22 NOTE — PROGRESS NOTES
Call placed to [phone] [facility] for Dr. Valadez Sensor, patient has several issues he would like addressed regarding his bleeding from rectum.

## 2021-02-23 ENCOUNTER — HOSPITAL ENCOUNTER (INPATIENT)
Age: 51
LOS: 3 days | Discharge: HOME | DRG: 348 | End: 2021-02-26
Attending: SURGERY | Admitting: SURGERY
Payer: COMMERCIAL

## 2021-02-23 ENCOUNTER — APPOINTMENT (OUTPATIENT)
Dept: NUCLEAR MEDICINE | Age: 51
DRG: 394 | End: 2021-02-23
Payer: COMMERCIAL

## 2021-02-23 VITALS
WEIGHT: 253 LBS | TEMPERATURE: 97.8 F | DIASTOLIC BLOOD PRESSURE: 76 MMHG | OXYGEN SATURATION: 96 % | RESPIRATION RATE: 16 BRPM | BODY MASS INDEX: 32.47 KG/M2 | HEIGHT: 74 IN | SYSTOLIC BLOOD PRESSURE: 148 MMHG | HEART RATE: 87 BPM

## 2021-02-23 DIAGNOSIS — M62.838 MUSCLE SPASM: ICD-10-CM

## 2021-02-23 DIAGNOSIS — G89.18 POST-OPERATIVE PAIN: Primary | ICD-10-CM

## 2021-02-23 PROBLEM — K92.2 GI BLEED: Status: ACTIVE | Noted: 2021-02-23

## 2021-02-23 LAB
ABO/RH: NORMAL
ANTIBODY SCREEN: NORMAL
BASOPHILS ABSOLUTE: 0 K/UL (ref 0–0.2)
BASOPHILS RELATIVE PERCENT: 0.5 %
EOSINOPHILS ABSOLUTE: 0.1 K/UL (ref 0–0.6)
EOSINOPHILS RELATIVE PERCENT: 2.1 %
HCT VFR BLD CALC: 22.8 % (ref 40.5–52.5)
HCT VFR BLD CALC: 24.3 % (ref 40.5–52.5)
HCT VFR BLD CALC: 24.4 % (ref 40.5–52.5)
HCT VFR BLD CALC: 25.4 % (ref 40.5–52.5)
HEMOGLOBIN: 7.7 G/DL (ref 13.5–17.5)
HEMOGLOBIN: 7.8 G/DL (ref 13.5–17.5)
HEMOGLOBIN: 8.1 G/DL (ref 13.5–17.5)
HEMOGLOBIN: 8.6 G/DL (ref 13.5–17.5)
INR BLD: 1.09 (ref 0.86–1.14)
LYMPHOCYTES ABSOLUTE: 1.2 K/UL (ref 1–5.1)
LYMPHOCYTES RELATIVE PERCENT: 18.2 %
MCH RBC QN AUTO: 28.3 PG (ref 26–34)
MCHC RBC AUTO-ENTMCNC: 33 G/DL (ref 31–36)
MCV RBC AUTO: 85.8 FL (ref 80–100)
MONOCYTES ABSOLUTE: 0.6 K/UL (ref 0–1.3)
MONOCYTES RELATIVE PERCENT: 8.6 %
NEUTROPHILS ABSOLUTE: 4.5 K/UL (ref 1.7–7.7)
NEUTROPHILS RELATIVE PERCENT: 70.6 %
PDW BLD-RTO: 15.8 % (ref 12.4–15.4)
PLATELET # BLD: 234 K/UL (ref 135–450)
PMV BLD AUTO: 6.8 FL (ref 5–10.5)
PROTHROMBIN TIME: 12.6 SEC (ref 10–13.2)
RBC # BLD: 2.85 M/UL (ref 4.2–5.9)
WBC # BLD: 6.4 K/UL (ref 4–11)

## 2021-02-23 PROCEDURE — 86900 BLOOD TYPING SEROLOGIC ABO: CPT

## 2021-02-23 PROCEDURE — 1200000000 HC SEMI PRIVATE

## 2021-02-23 PROCEDURE — 85025 COMPLETE CBC W/AUTO DIFF WBC: CPT

## 2021-02-23 PROCEDURE — 6360000002 HC RX W HCPCS: Performed by: NURSE PRACTITIONER

## 2021-02-23 PROCEDURE — 85014 HEMATOCRIT: CPT

## 2021-02-23 PROCEDURE — 78278 ACUTE GI BLOOD LOSS IMAGING: CPT

## 2021-02-23 PROCEDURE — 2580000003 HC RX 258: Performed by: INTERNAL MEDICINE

## 2021-02-23 PROCEDURE — 99239 HOSP IP/OBS DSCHRG MGMT >30: CPT | Performed by: INTERNAL MEDICINE

## 2021-02-23 PROCEDURE — 99233 SBSQ HOSP IP/OBS HIGH 50: CPT | Performed by: INTERNAL MEDICINE

## 2021-02-23 PROCEDURE — A9560 TC99M LABELED RBC: HCPCS | Performed by: INTERNAL MEDICINE

## 2021-02-23 PROCEDURE — 85610 PROTHROMBIN TIME: CPT

## 2021-02-23 PROCEDURE — 36415 COLL VENOUS BLD VENIPUNCTURE: CPT

## 2021-02-23 PROCEDURE — C9113 INJ PANTOPRAZOLE SODIUM, VIA: HCPCS | Performed by: NURSE PRACTITIONER

## 2021-02-23 PROCEDURE — 85018 HEMOGLOBIN: CPT

## 2021-02-23 PROCEDURE — 3430000000 HC RX DIAGNOSTIC RADIOPHARMACEUTICAL: Performed by: INTERNAL MEDICINE

## 2021-02-23 PROCEDURE — 99223 1ST HOSP IP/OBS HIGH 75: CPT | Performed by: SURGERY

## 2021-02-23 PROCEDURE — 86850 RBC ANTIBODY SCREEN: CPT

## 2021-02-23 PROCEDURE — 86901 BLOOD TYPING SEROLOGIC RH(D): CPT

## 2021-02-23 PROCEDURE — 2580000003 HC RX 258: Performed by: STUDENT IN AN ORGANIZED HEALTH CARE EDUCATION/TRAINING PROGRAM

## 2021-02-23 PROCEDURE — 6370000000 HC RX 637 (ALT 250 FOR IP): Performed by: STUDENT IN AN ORGANIZED HEALTH CARE EDUCATION/TRAINING PROGRAM

## 2021-02-23 RX ORDER — PANTOPRAZOLE SODIUM 40 MG/10ML
40 INJECTION, POWDER, LYOPHILIZED, FOR SOLUTION INTRAVENOUS
Status: DISCONTINUED | OUTPATIENT
Start: 2021-02-24 | End: 2021-02-25

## 2021-02-23 RX ORDER — ONDANSETRON 4 MG/1
4 TABLET, ORALLY DISINTEGRATING ORAL EVERY 8 HOURS PRN
Status: DISCONTINUED | OUTPATIENT
Start: 2021-02-23 | End: 2021-02-23

## 2021-02-23 RX ORDER — SODIUM CHLORIDE 0.9 % (FLUSH) 0.9 %
10 SYRINGE (ML) INJECTION PRN
Status: DISCONTINUED | OUTPATIENT
Start: 2021-02-23 | End: 2021-02-26 | Stop reason: HOSPADM

## 2021-02-23 RX ORDER — SODIUM CHLORIDE, SODIUM LACTATE, POTASSIUM CHLORIDE, CALCIUM CHLORIDE 600; 310; 30; 20 MG/100ML; MG/100ML; MG/100ML; MG/100ML
INJECTION, SOLUTION INTRAVENOUS CONTINUOUS
Status: DISCONTINUED | OUTPATIENT
Start: 2021-02-23 | End: 2021-02-24

## 2021-02-23 RX ORDER — ONDANSETRON 2 MG/ML
4 INJECTION INTRAMUSCULAR; INTRAVENOUS EVERY 6 HOURS PRN
Status: DISCONTINUED | OUTPATIENT
Start: 2021-02-23 | End: 2021-02-23

## 2021-02-23 RX ORDER — ACETAMINOPHEN 500 MG
1000 TABLET ORAL EVERY 6 HOURS PRN
Status: DISCONTINUED | OUTPATIENT
Start: 2021-02-23 | End: 2021-02-25

## 2021-02-23 RX ORDER — SODIUM CHLORIDE 0.9 % (FLUSH) 0.9 %
10 SYRINGE (ML) INJECTION EVERY 12 HOURS SCHEDULED
Status: DISCONTINUED | OUTPATIENT
Start: 2021-02-23 | End: 2021-02-23

## 2021-02-23 RX ADMIN — Medication 21.4 MILLICURIE: at 01:08

## 2021-02-23 RX ADMIN — SODIUM CHLORIDE: 9 INJECTION, SOLUTION INTRAVENOUS at 03:50

## 2021-02-23 RX ADMIN — ACETAMINOPHEN 1000 MG: 500 TABLET ORAL at 17:55

## 2021-02-23 RX ADMIN — SODIUM CHLORIDE, POTASSIUM CHLORIDE, SODIUM LACTATE AND CALCIUM CHLORIDE: 600; 310; 30; 20 INJECTION, SOLUTION INTRAVENOUS at 17:55

## 2021-02-23 RX ADMIN — Medication 10 ML: at 08:46

## 2021-02-23 RX ADMIN — SODIUM CHLORIDE: 9 INJECTION, SOLUTION INTRAVENOUS at 10:27

## 2021-02-23 RX ADMIN — PANTOPRAZOLE SODIUM 40 MG: 40 INJECTION, POWDER, FOR SOLUTION INTRAVENOUS at 08:46

## 2021-02-23 ASSESSMENT — PAIN SCALES - GENERAL
PAINLEVEL_OUTOF10: 0

## 2021-02-23 NOTE — PROGRESS NOTES
[facility]) Physicians Gastroenterology Progress Note    Patient:   Merna Irene II   :    1970   Facility:   [facility]     [unfilled]     [place], [place]  @[facility]@  Date:     2021  Consultant:   Cristian Luis MD      Subjective:     53 y.o. male admitted 2021 with BRBPR (bright red blood per rectum) [K62.5] and seen for hematochezia    Had 3 episodes, last one at 11 pm, of hematochezia with bright red blood overnight. Hb 7.6 with 1 more unit yesterday.   Vitals stable  No abdominal pain  Got a bleeding scan at night that was negative    Current Medications include:   Scheduled Meds:   pantoprazole  40 mg Intravenous BID    sodium chloride flush  10 mL Intravenous 2 times per day     Continuous Infusions:   sodium chloride      sodium chloride 150 mL/hr at 21 0350     PRN Meds:.meperidine, HYDROmorphone, HYDROmorphone, morphine, morphine, sodium chloride, sodium chloride flush, promethazine **OR** ondansetron, acetaminophen **OR** acetaminophen    Allergies: No Known Allergies    Objective:   Vital Signs:  Temp (24hrs), Av.3 °F (36.8 °C), Min:97.2 °F (36.2 °C), Max:99.8 °F (45.2 °C)     Systolic (93HGJ), QRX:336 , Min:84 , JFM:802      Diastolic (04AWO), FQ, Min:28, Max:94     Pulse  Av.2  Min: 78  Max: 103  BP (!) 153/84   Pulse 83   Temp 97.6 °F (36.4 °C) (Oral)   Resp 16   Ht 6' 2\" (1.88 m)   Wt 253 lb (114.8 kg)   SpO2 97%   BMI 32.48 kg/m²      Physical Exam:   General appearance: AAO3, NAD  HEENT: +pallor, no icterus  Lungs: clear to auscultation bilaterally  Heart: S1S2 regular, no murmurs  Abdomen: soft, non-tender, not distended, Bs+, no hepatosplenomegaly  Ext: no edema    Lab and Imaging Review   Recent Labs     217 21  0920 21  0921 21  0921 21  2104 21  0327 21  0917 21  1549 21  1930 21  0217   WBC 5.0 4.0  --   --   --   --   --   --   --   -- HGB 7.5* 7.4*  --    < > 7.9* 6.3* 7.6* 7.0* 7.1* 7.7*   MCV 84.7 84.2  --   --   --   --   --   --   --   --     253  --   --   --   --   --   --   --   --    INR 0.96  --   --   --   --   --   --   --   --   --      --  143  --   --   --   --   --   --   --    K 3.8  --  4.5  --   --   --   --   --   --   --      --  111*  --   --   --   --   --   --   --    CO2 28  --  26  --   --   --   --   --   --   --    BUN 17  --  15  --   --   --   --   --   --   --    CREATININE 1.0  --  1.0  --   --   --   --   --   --   --    GLUCOSE 120*  --  108*  --   --   --   --   --   --   --    CALCIUM 9.1  --  8.8  --   --   --   --   --   --   --    PROT 6.4  --  6.1*  --   --   --   --   --   --   --    LABALBU 4.0  --  4.0  --   --   --   --   --   --   --    AST 19  --  19  --   --   --   --   --   --   --    ALT 20  --  21  --   --   --   --   --   --   --    ALKPHOS 51  --  50  --   --   --   --   --   --   --    BILITOT 0.3  --  0.5  --   --   --   --   --   --   --     < > = values in this interval not displayed. Assessment and Plan:    Hematochezia/anemia from blood loss    EGD negative 2/22/21, colonoscopy showed ulceration likely from prior banding at dentate but yesterday no active blood or bleeding was seen in the rest of the colon, hemospray was used at the ulceration. Continued to have blood per rectum - bleeding scan obtained overnight was negative for active bleeding. At this point bleeding related to post banding hemorrhoid ulcers vs friable hemorrhoids is considered. Doubt a more proximal source given lack of any blood yesterday at colonoscopy and negative bleeding scan. Will discuss with Surgery to see if they can help or whether he would benefit from transfer to [facility] Colorectal surgery input. D/w Medicine attending Dr Radha Irene.     ADDENDUM 10 AM  Case was discussed with Dr Micah Grace (Surgery/[facility]) and Dr Shorty Sarah (Colorectal Surgery/[facility])  Given continued BRBPR and concern for bleeding related to hemorrhoids or recent hemorrhoid banding ulcer would transfer to [facility] for Colorectal Surgery opinion  Dr Vanna Sánchez will be accepting physician at [facility] patient who is ok with transfer  Informed Medicine attending Dr Elidia Maharaj.

## 2021-02-23 NOTE — FLOWSHEET NOTE
Pt shared the story about his hospitalization. He expressed his deep farhana in God and how God has been leading him. He said God has been there for him as he makes decisions where he would like to get his surgery. Pt welcomes prayer and also prayed for  and other care providers. 02/23/21 1100   Encounter Summary   Services provided to: Patient and family together   Referral/Consult From: [employer]; Family members; Jain/farhana community   Continue Visiting   (2/23 Supported and prayed with pt and spouse)   Complexity of Encounter Moderate   Length of Encounter 30 minutes   Routine   Type Initial   Assessment Calm; Approachable   Intervention Active listening;Explored feelings, thoughts, concerns;Prayer;Sustaining presence/ Ministry of presence; Discussed meaning/purpose;Discussed relationship with God;Discussed belief system/Latter-day practices/farhana   Outcome Expressed gratitude;Expressed feelings of emily, peace, and/or awe;Engaged in conversation; Shared life review;Encouraged; Hopeful
You can access the FollowMyHealth Patient Portal offered by [facility] by registering at the following website: [email or web address]. By joining [facility]’s FollowMyHealth portal, you will also be able to view your health information using other applications (apps) compatible with our system.

## 2021-02-23 NOTE — PROGRESS NOTES
PM assessment completed. Scheduled medications given per MAR. VSS room air, A/O x4 denies any needs at this time, one episode of blood from rectum. Call light in reach, will monitor.

## 2021-02-23 NOTE — PROGRESS NOTES
Patient resting in bed and denies any pain or needs at this time. Call light within reach. Report given to Triptrotting.  She has been made aware if patient continues to have bright red stools, to [facility]  [facility] Dr. Angelica Oscar per his request.

## 2021-02-23 NOTE — PROGRESS NOTES
[facility] note: Three episodes of bright red blood per rectum since colonoscopy  Vitals stable, Hb 7,1  Based on procedures today could be related to hemorrhoidal bleeding/banding ulcers but given repeated bleeding will rule out other/small bowel etiology with a bleeding scan  Watch Hb, transfuse to keep >7  STAT NM bleeding scan ordered  Discussed with nurse.

## 2021-02-23 NOTE — PROGRESS NOTES
Assessment completed. Patient is A&O x4. Medications given by student nurse and instructor. Patient denies further needs at this time. Wife present. Call light within reach.

## 2021-02-23 NOTE — H&P
General Surgery   Resident History and Physical       Chief Complaint: GI bleed    History of Present Illness:    Jennifer West II is a 53 y.o. male with Hx of hypertension and asthma presented to [facility] for fatigue and rectal bleeding. The patient states he had hemorrhoid banding done on 1/29 which he has had bleeding from his rectum since. On presentation to [facility] facility the patient had a hemoglobin of 7.5 with last known hemoglobin of 12 in 2018. The patient was admitted and hemoglobin down trended to 6.3 over which time he required 3 units of PRBCs. The patient underwent colonoscopy and EGD yesterday. EGD showed no source of upper GI bleed, colonoscopy demonstrated 1 polyp which was removed and obvious hemorrhoid bleeding which hemospray was applied. The patient had tagged red blood cell scan today with no source of GI bleeding. The patient states he has had 3 bloody bowel movements since then. Last hemoglobin was 8.6 from 7.8 previously. The patient states he has been having bloody bowel movements since his hemorrhoid banding with no other associated symptoms such as urinary retention, fever, rectal pain or hemorrhoid prolapse. He has been afebrile and hemodynamically at [facility].     Past Medical History:        Diagnosis Date    Asthma     COVID-19 01/29/2021    Hypertension        Past Surgical History:        Procedure Laterality Date    BONY PELVIS SURGERY      COLONOSCOPY      polyp removal    COLONOSCOPY N/A 2/22/2021    COLONOSCOPY POLYPECTOMY SNARE/COLD BIOPSY performed by Che Brown MD at [facility] COLONOSCOPY N/A 2/22/2021    COLONOSCOPY CONTROL HEMORRHAGE performed by Che Brown MD at [facility]      UPPER GASTROINTESTINAL ENDOSCOPY N/A 2/22/2021    EGD W/ANES. (13:30) + COVID 1/29 performed by Che Brown MD at [id number] [place] [address] Allergies:    Patient has no known allergies. Medications:   Home Meds  No current facility-administered medications on file prior to encounter. Current Outpatient Medications on File Prior to Encounter   Medication Sig Dispense Refill    Multiple Vitamins-Minerals (THERAPEUTIC MULTIVITAMIN-MINERALS) tablet Take 1 tablet by mouth daily      Ascorbic Acid (VITAMIN C) 250 MG tablet Take 1,000 mg by mouth daily      hydrocortisone 1 % ointment Apply topically 2 times daily AN formula 3 (HC)      Omega 3 1200 MG CAPS Take by mouth 2 caps      albuterol (PROVENTIL;VENTOLIN) 2 MG/5ML syrup Take 2 mg by mouth 3 times daily      albuterol (ACCUNEB) 1.25 MG/3ML nebulizer solution Inhale 1 ampule into the lungs every 4 hours as needed for Wheezing         Current Meds      [START ON 2/24/2021] pantoprazole (PROTONIX) injection 40 mg, QAM AC      sodium chloride flush 0.9 % injection 10 mL, 2 times per day      sodium chloride flush 0.9 % injection 10 mL, PRN      ondansetron (ZOFRAN-ODT) disintegrating tablet 4 mg, Q8H PRN    Or      ondansetron (ZOFRAN) injection 4 mg, Q6H PRN      lactated ringers infusion, Continuous      acetaminophen (TYLENOL) tablet 1,000 mg, Q6H PRN        Family History:   No family history on file. Social History:   TOBACCO:   reports that he has never smoked. He has never used smokeless tobacco.  ETOH:   reports no history of alcohol use. DRUGS:   reports no history of drug use. Review of Systems:   14 point review of systems was conducted and all pertinent positives and negatives were included in the HPI.     Physical exam:    Vitals:    02/23/21 1607   BP: (!) 157/81   Pulse: 83   Resp: 17   Temp: 99.8 °F (37.7 °C)   TempSrc: Oral   SpO2: 97%       General appearance: alert, no acute distress, grooming appropriate  Eyes: PERRLA, no scleral icterus  Neck: trachea midline, no JVD, no lymphadenopathy  Chest/Lungs: CTAB, no crackles/rales, wheezes/rhonchi, normal effort  Cardiovascular: RRR, no murmurs/gallops/rubs  Abdomen: soft, non-tender, non-distended, +BS, no guarding/rigidity  Skin: warm and dry, no rashes  Extremities: no edema, no cyanosis  Rectal: External exam revealed no thrombosed hemorrhoids or obvious signs of bleeding. Digital rectal exam demonstrated no blood or stool. No masses or hemorrhoidal tissue was felt. No fluctuance or signs of perirectal abscess present. Neuro: A&Ox3, no focal deficits, sensation intact    Labs:    CBC:   Recent Labs     02/20/21 2217 02/21/21  0920 02/21/21  0920 02/23/21  0217 02/23/21  0731 02/23/21  1332   WBC 5.0 4.0  --   --   --   --    HGB 7.5* 7.4*   < > 7.7* 7.8* 8.6*   HCT 22.2* 22.2*   < > 22.8* 24.3* 25.4*   MCV 84.7 84.2  --   --   --   --     253  --   --   --   --     < > = values in this interval not displayed. BMP:   Recent Labs     02/20/21 2217 02/21/21 0921    143   K 3.8 4.5    111*   CO2 28 26   BUN 17 15   CREATININE 1.0 1.0     PT/INR:   Recent Labs     02/20/21 2217   PROTIME 11.1   INR 0.96     APTT:   Recent Labs     02/20/21 2217   APTT 27.0     Liver Profile:   Lab Results   Component Value Date    AST 19 02/21/2021    ALT 21 02/21/2021    BILITOT 0.5 02/21/2021    ALKPHOS 50 02/21/2021   No results found for: CHOL, HDL, TRIG  UA: No results found for: NITRITE, COLORU, PHUR, LABCAST, WBCUA, RBCUA, MUCUS, TRICHOMONAS, YEAST, BACTERIA, CLARITYU, SPECGRAV, LEUKOCYTESUR, UROBILINOGEN, BILIRUBINUR, BLOODU, GLUCOSEU, AMORPHOUS    Imaging:   No orders to display       Assessment/Plan: This is a 53 y.o. male with Hx of hypertension and asthma presented to [facility] facility for fatigue and rectal bleeding. Patient was previously admitted at [address] [place] [address] for GI bleeding requiring 3 units of PRBCs. Patient underwent colonoscopy and EGD yesterday which only showed hemorrhoidal tissue with signs of bleeding for which hemospray was applied.   Tagged red blood cell scan performed today showed no signs of GI bleeding. Patient's last hemoglobin was stable at 8.6 from 7.8. Patient does admit to having bloody bowel movements yesterday. On rectal exam there is no obvious signs of rectal bleeding. As patient still having bloody bowel movements as of last night patient will go for exam under anesthesia with possible hemorrhoidectomy tomorrow to further access source of possible bleeding.    - Patient mid to general surgery service  - Patient will proceed to OR tomorrow for exam under anesthesia with possible hemorrhoidectomy  - We will repeat hemoglobin tonight at 7:30, if stable will recheck in the morning  - PPI  - IV fluids  - NPO at midnight    Patient was discussed with Pilar Calero DO  02/23/21  6:33 PM      I performed a history and physical examination of the patient and discussed management with the resident. I reviewed the resident's note and agree with the documented findings and plan of care. RBA discussed with patient this morning. Labs, prior reports reviewed.  Discussed with Dr. Tacho Bedolla by phone    Rosi Islas M.D.  2/24/21   2:05 PM

## 2021-02-23 NOTE — PROGRESS NOTES
PRE-OP NOTE  Department of Surgery      Chief Complaint or Reason for Surgery: GI bleed after hemorrhoid banding    Procedure: Exam under anesthesia possible hemorrhoidectomy  Expected time: Add-on    Plan  1. Diet: NPO at midnight  2. IVF:    3. Antibiotics: 2g cefoxitin, on call to OR  4. Labs to be drawn: Type and Screen, INR  5. Anesthesia: to see patient  6. Consent: Obtained and placed in chart  7. Pulmonary: CXR: 2/20, reviewed  8. Cardiac: EKG: Last EKG 2/20, reviewed    Iwona Wang DO  02/23/21  6:47 PM    I performed a history and physical examination of the patient and discussed management with the resident. I reviewed the resident's note and agree with the documented findings and plan of care.     Nitish Jay M.D.  2/24/21   2:06 PM

## 2021-02-23 NOTE — DISCHARGE SUMMARY
Name:  David Evans  Room:  /9134-39  MRN:    [id number]    Discharge Summary      This discharge summary is in conjunction with a complete physical exam done on the day of discharge. Attending Physician: Dr. Adilene Isaac  Discharging Physician: Dr. Ruiz Orr: 2/20/2021  Discharge:   2/23/2021    HPI:  The patient is a 53 y.o. male with PMH below, presents with LEONARD, BRBPR, fatigue. Pt reports that he normally rides a bicycle several times a week for multiple miles regularly at baseline. He says now he gets winded with just a flight of steps. He has been trying to exercise but has been unable to. He reports that he had COVID, Dx at the end of last month on 1/29. He has completed quarantine. He reports that he underwent internal rectal hemorrhoid banding on 1/29 at OSH. One of the bands has come out. He reports that he has frequent BRBPR both w/ BM's and in between. He denies melena and says it has only been BRBPR. He was found to have a low HGB in the ED. Diagnoses this Admission and Hospital Course   GI Bleed  - Hgb 7.5. Last known value was 15.1 on 10/2018  - Tranfused 3 units PRBCs  - H/o internal hemorrhoids. Given a dose of Octreotide. S/p internal hemorrhoid banding on 1/29/2021  - monitored H/H  - GI consult  -EGD and colonoscopy showed some ulceration in the area of banding of the hemorrhoids but no active bleeding.   - IV PPI. Transfer to [facility] for colorectal surgery opinion for hemorrhoidal bleeding.      Acute blood loss anemia  - symptomatic.   - monitored H/H.   - dropped to 6.3  - 3 units PRBCs. Up to 7.8     COVID positive on 1/29/2021  -No symptoms. Stopped droplet precautions.     Hypertension  - BP elevated. Not on any home medications.    - monitored for now.      DVT Prophylaxis: SCDs    Procedures (Please Review Full Report for Details)  Colonoscopy and EGD  The esophagus is normal. Normal stomach and duodenum to the second portion, there was no blood in the UGI tract.     There were medium sized friable hemorrhoids with 2 ulcerated areas at the dentate line/distal rectum. There was NO blood in the entire colon (yellow fluid). Scope was advanced to the terminal ileum which appears normal with yellow-green fluid. One 8 mm pedunculated sigmoid polyp removed with hot snare. No other clear areas to suggest bleeding source except the recent hemorrhoid banding related ulcers so these were sprayed with hemospray. Consults    GI      Physical Exam at Discharge:    BP (!) 153/92   Pulse 92   Temp 98.5 °F (36.9 °C) (Oral)   Resp 18   Ht 6' 2\" (1.88 m)   Wt 253 lb (114.8 kg)   SpO2 99%   BMI 32.48 kg/m²     Gen: No distress. Alert. Eyes: PERRL. No sclera icterus. No conjunctival injection. ENT: No discharge. Pharynx clear. Neck: No JVD.  No Carotid Bruit. Trachea midline. Resp: No accessory muscle use. No crackles. No wheezes. No rhonchi. CV: Regular rate. Regular rhythm. No murmur. No rub. No edema. Capillary Refill: Brisk,< 3 seconds   Peripheral Pulses: +2 palpable, equal bilaterally   GI: Non-tender. Non-distended. No masses. No organomegaly. Normal bowel sounds. No hernia. Skin: Warm and dry. No nodule on exposed extremities. No rash on exposed extremities. M/S: No cyanosis. No joint deformity. No clubbing. Neuro: Awake. Grossly nonfocal    Psych: Oriented x 3. No anxiety or agitation    CBC:   Recent Labs     02/20/21 2217 02/21/21  0920 02/21/21  0920 02/22/21  1930 02/23/21  0217 02/23/21  0731   WBC 5.0 4.0  --   --   --   --    HGB 7.5* 7.4*   < > 7.1* 7.7* 7.8*   HCT 22.2* 22.2*   < > 21.9* 22.8* 24.3*   MCV 84.7 84.2  --   --   --   --     253  --   --   --   --     < > = values in this interval not displayed.      BMP:   Recent Labs     02/20/21 2217 02/21/21  0921    143   K 3.8 4.5    111*   CO2 28 26   BUN 17 15   CREATININE 1.0 1.0     LIVER PROFILE:   Recent Labs     02/20/21 2217 02/21/21  0921   AST 19 19   ALT 20 21   BILITOT 0.3 0.5   ALKPHOS 51 50     PT/INR:   Recent Labs     02/20/21 2217   PROTIME 11.1   INR 0.96     APTT:   Recent Labs     02/20/21 2217   APTT 27.0     CARDIAC ENZYMES  Recent Labs     02/20/21 2217   TROPONINI <0.01       CULTURES  Results for Balbir Spencer" (MRN [id number]) as of 2/22/2021 11:48    Ref. Range 1/29/2021 00:00   SARS-CoV-2 Unknown POSITIVE (A)          RADIOLOGY  NM GI BLOOD LOSS   Final Result   No evidence of active GI bleeding during acquisition. RECOMMENDATIONS:   If the patient shows hemodynamic signs of an active bleed in the next 20   hours, additional images can be acquired. XR CHEST PORTABLE   Final Result      No acute intrathoracic pathology. Discharge Medications     Medication List      ASK your doctor about these medications    * albuterol 2 MG/5ML syrup  Commonly known as: PROVENTIL;VENTOLIN     * albuterol 1.25 MG/3ML nebulizer solution  Commonly known as: ACCUNEB     hydrocortisone 1 % ointment     Omega 3 1200 MG Caps     therapeutic multivitamin-minerals tablet     vitamin C 250 MG tablet         * This list has 2 medication(s) that are the same as other medications prescribed for you. Read the directions carefully, and ask your doctor or other care provider to review them with you. Transferred to [facility] in stable condition. I called the Transfer Center/more than 30 mts spent.       Erik Bailey 2/24/2021 7:23 PM

## 2021-02-23 NOTE — PROGRESS NOTES
Patient being transported to [facility] via [facility]. Patient aware of reason for transport and agrees. All documentation filled out. IV remaining in place with NS@ 125mL/hr. Telemetry monitor removed and returned to [facility]. Pt left facility in stable condition to [facility] with all of their personal belongings.

## 2021-02-24 ENCOUNTER — ANESTHESIA (OUTPATIENT)
Dept: OPERATING ROOM | Age: 51
DRG: 348 | End: 2021-02-24
Payer: COMMERCIAL

## 2021-02-24 ENCOUNTER — ANESTHESIA EVENT (OUTPATIENT)
Dept: OPERATING ROOM | Age: 51
DRG: 348 | End: 2021-02-24
Payer: COMMERCIAL

## 2021-02-24 VITALS
OXYGEN SATURATION: 99 % | RESPIRATION RATE: 15 BRPM | SYSTOLIC BLOOD PRESSURE: 114 MMHG | TEMPERATURE: 97.9 F | DIASTOLIC BLOOD PRESSURE: 54 MMHG

## 2021-02-24 LAB
ALBUMIN SERPL-MCNC: 3.4 G/DL (ref 3.4–5)
ANION GAP SERPL CALCULATED.3IONS-SCNC: 7 MMOL/L (ref 3–16)
APTT: 30.3 SEC (ref 24.2–36.2)
BASOPHILS ABSOLUTE: 0 K/UL (ref 0–0.2)
BASOPHILS ABSOLUTE: 0 K/UL (ref 0–0.2)
BASOPHILS RELATIVE PERCENT: 0.1 %
BASOPHILS RELATIVE PERCENT: 1 %
BUN BLDV-MCNC: 6 MG/DL (ref 7–20)
CALCIUM SERPL-MCNC: 8.3 MG/DL (ref 8.3–10.6)
CHLORIDE BLD-SCNC: 108 MMOL/L (ref 99–110)
CO2: 25 MMOL/L (ref 21–32)
CREAT SERPL-MCNC: 0.9 MG/DL (ref 0.9–1.3)
EOSINOPHILS ABSOLUTE: 0 K/UL (ref 0–0.6)
EOSINOPHILS ABSOLUTE: 0.2 K/UL (ref 0–0.6)
EOSINOPHILS RELATIVE PERCENT: 0.1 %
EOSINOPHILS RELATIVE PERCENT: 3.9 %
GFR AFRICAN AMERICAN: >60
GFR NON-AFRICAN AMERICAN: >60
GLUCOSE BLD-MCNC: 100 MG/DL (ref 70–99)
HCT VFR BLD CALC: 22.5 % (ref 40.5–52.5)
HCT VFR BLD CALC: 25.9 % (ref 40.5–52.5)
HEMOGLOBIN: 7.7 G/DL (ref 13.5–17.5)
HEMOGLOBIN: 8.6 G/DL (ref 13.5–17.5)
LYMPHOCYTES ABSOLUTE: 0.4 K/UL (ref 1–5.1)
LYMPHOCYTES ABSOLUTE: 1.1 K/UL (ref 1–5.1)
LYMPHOCYTES RELATIVE PERCENT: 26.8 %
LYMPHOCYTES RELATIVE PERCENT: 7.3 %
MAGNESIUM: 1.8 MG/DL (ref 1.8–2.4)
MCH RBC QN AUTO: 27.9 PG (ref 26–34)
MCH RBC QN AUTO: 28.6 PG (ref 26–34)
MCHC RBC AUTO-ENTMCNC: 33.2 G/DL (ref 31–36)
MCHC RBC AUTO-ENTMCNC: 34.2 G/DL (ref 31–36)
MCV RBC AUTO: 83.8 FL (ref 80–100)
MCV RBC AUTO: 84.2 FL (ref 80–100)
MONOCYTES ABSOLUTE: 0.1 K/UL (ref 0–1.3)
MONOCYTES ABSOLUTE: 0.4 K/UL (ref 0–1.3)
MONOCYTES RELATIVE PERCENT: 10.4 %
MONOCYTES RELATIVE PERCENT: 2.2 %
NEUTROPHILS ABSOLUTE: 2.3 K/UL (ref 1.7–7.7)
NEUTROPHILS ABSOLUTE: 5.4 K/UL (ref 1.7–7.7)
NEUTROPHILS RELATIVE PERCENT: 57.9 %
NEUTROPHILS RELATIVE PERCENT: 90.3 %
PDW BLD-RTO: 15.9 % (ref 12.4–15.4)
PDW BLD-RTO: 16.3 % (ref 12.4–15.4)
PHOSPHORUS: 3.1 MG/DL (ref 2.5–4.9)
PLATELET # BLD: 207 K/UL (ref 135–450)
PLATELET # BLD: 235 K/UL (ref 135–450)
PMV BLD AUTO: 6.9 FL (ref 5–10.5)
PMV BLD AUTO: 7 FL (ref 5–10.5)
POTASSIUM SERPL-SCNC: 3.5 MMOL/L (ref 3.5–5.1)
RBC # BLD: 2.69 M/UL (ref 4.2–5.9)
RBC # BLD: 3.07 M/UL (ref 4.2–5.9)
SODIUM BLD-SCNC: 140 MMOL/L (ref 136–145)
WBC # BLD: 4 K/UL (ref 4–11)
WBC # BLD: 5.9 K/UL (ref 4–11)

## 2021-02-24 PROCEDURE — 51798 US URINE CAPACITY MEASURE: CPT

## 2021-02-24 PROCEDURE — 3700000000 HC ANESTHESIA ATTENDED CARE: Performed by: SURGERY

## 2021-02-24 PROCEDURE — 88304 TISSUE EXAM BY PATHOLOGIST: CPT

## 2021-02-24 PROCEDURE — 51701 INSERT BLADDER CATHETER: CPT

## 2021-02-24 PROCEDURE — 3600000014 HC SURGERY LEVEL 4 ADDTL 15MIN: Performed by: SURGERY

## 2021-02-24 PROCEDURE — 36415 COLL VENOUS BLD VENIPUNCTURE: CPT

## 2021-02-24 PROCEDURE — 7100000001 HC PACU RECOVERY - ADDTL 15 MIN: Performed by: SURGERY

## 2021-02-24 PROCEDURE — 2500000003 HC RX 250 WO HCPCS: Performed by: SURGERY

## 2021-02-24 PROCEDURE — 85730 THROMBOPLASTIN TIME PARTIAL: CPT

## 2021-02-24 PROCEDURE — 6360000002 HC RX W HCPCS: Performed by: SURGERY

## 2021-02-24 PROCEDURE — 2580000003 HC RX 258: Performed by: NURSE ANESTHETIST, CERTIFIED REGISTERED

## 2021-02-24 PROCEDURE — 2580000003 HC RX 258: Performed by: SURGERY

## 2021-02-24 PROCEDURE — 6360000002 HC RX W HCPCS: Performed by: ANESTHESIOLOGY

## 2021-02-24 PROCEDURE — 2500000003 HC RX 250 WO HCPCS: Performed by: NURSE ANESTHETIST, CERTIFIED REGISTERED

## 2021-02-24 PROCEDURE — 6360000002 HC RX W HCPCS: Performed by: STUDENT IN AN ORGANIZED HEALTH CARE EDUCATION/TRAINING PROGRAM

## 2021-02-24 PROCEDURE — 83735 ASSAY OF MAGNESIUM: CPT

## 2021-02-24 PROCEDURE — 2720000010 HC SURG SUPPLY STERILE: Performed by: SURGERY

## 2021-02-24 PROCEDURE — 2709999900 HC NON-CHARGEABLE SUPPLY: Performed by: SURGERY

## 2021-02-24 PROCEDURE — 80069 RENAL FUNCTION PANEL: CPT

## 2021-02-24 PROCEDURE — 6360000002 HC RX W HCPCS: Performed by: NURSE ANESTHETIST, CERTIFIED REGISTERED

## 2021-02-24 PROCEDURE — 6370000000 HC RX 637 (ALT 250 FOR IP): Performed by: STUDENT IN AN ORGANIZED HEALTH CARE EDUCATION/TRAINING PROGRAM

## 2021-02-24 PROCEDURE — 06BY0ZC EXCISION OF HEMORRHOIDAL PLEXUS, OPEN APPROACH: ICD-10-PCS | Performed by: SURGERY

## 2021-02-24 PROCEDURE — 85025 COMPLETE CBC W/AUTO DIFF WBC: CPT

## 2021-02-24 PROCEDURE — 85018 HEMOGLOBIN: CPT

## 2021-02-24 PROCEDURE — 46260 REMOVE IN/EX HEM GROUPS 2+: CPT | Performed by: SURGERY

## 2021-02-24 PROCEDURE — C9113 INJ PANTOPRAZOLE SODIUM, VIA: HCPCS | Performed by: STUDENT IN AN ORGANIZED HEALTH CARE EDUCATION/TRAINING PROGRAM

## 2021-02-24 PROCEDURE — 1200000000 HC SEMI PRIVATE

## 2021-02-24 PROCEDURE — 3600000004 HC SURGERY LEVEL 4 BASE: Performed by: SURGERY

## 2021-02-24 PROCEDURE — 2580000003 HC RX 258: Performed by: STUDENT IN AN ORGANIZED HEALTH CARE EDUCATION/TRAINING PROGRAM

## 2021-02-24 PROCEDURE — 3700000001 HC ADD 15 MINUTES (ANESTHESIA): Performed by: SURGERY

## 2021-02-24 PROCEDURE — C9290 INJ, BUPIVACAINE LIPOSOME: HCPCS | Performed by: SURGERY

## 2021-02-24 PROCEDURE — 7100000000 HC PACU RECOVERY - FIRST 15 MIN: Performed by: SURGERY

## 2021-02-24 RX ORDER — SODIUM CHLORIDE, SODIUM LACTATE, POTASSIUM CHLORIDE, CALCIUM CHLORIDE 600; 310; 30; 20 MG/100ML; MG/100ML; MG/100ML; MG/100ML
INJECTION, SOLUTION INTRAVENOUS CONTINUOUS PRN
Status: DISCONTINUED | OUTPATIENT
Start: 2021-02-24 | End: 2021-02-24 | Stop reason: SDUPTHER

## 2021-02-24 RX ORDER — FENTANYL CITRATE 50 UG/ML
25 INJECTION, SOLUTION INTRAMUSCULAR; INTRAVENOUS EVERY 5 MIN PRN
Status: COMPLETED | OUTPATIENT
Start: 2021-02-24 | End: 2021-02-24

## 2021-02-24 RX ORDER — TAMSULOSIN HYDROCHLORIDE 0.4 MG/1
0.4 CAPSULE ORAL DAILY
Status: DISCONTINUED | OUTPATIENT
Start: 2021-02-24 | End: 2021-02-26 | Stop reason: HOSPADM

## 2021-02-24 RX ORDER — DEXAMETHASONE SODIUM PHOSPHATE 4 MG/ML
INJECTION, SOLUTION INTRA-ARTICULAR; INTRALESIONAL; INTRAMUSCULAR; INTRAVENOUS; SOFT TISSUE PRN
Status: DISCONTINUED | OUTPATIENT
Start: 2021-02-24 | End: 2021-02-24 | Stop reason: SDUPTHER

## 2021-02-24 RX ORDER — OXYCODONE HYDROCHLORIDE AND ACETAMINOPHEN 5; 325 MG/1; MG/1
1 TABLET ORAL PRN
Status: DISCONTINUED | OUTPATIENT
Start: 2021-02-24 | End: 2021-02-24 | Stop reason: HOSPADM

## 2021-02-24 RX ORDER — DOCUSATE SODIUM 100 MG/1
100 CAPSULE, LIQUID FILLED ORAL 2 TIMES DAILY PRN
Status: DISCONTINUED | OUTPATIENT
Start: 2021-02-24 | End: 2021-02-25

## 2021-02-24 RX ORDER — MAGNESIUM HYDROXIDE 1200 MG/15ML
LIQUID ORAL CONTINUOUS PRN
Status: COMPLETED | OUTPATIENT
Start: 2021-02-24 | End: 2021-02-24

## 2021-02-24 RX ORDER — DIPHENHYDRAMINE HYDROCHLORIDE 50 MG/ML
12.5 INJECTION INTRAMUSCULAR; INTRAVENOUS
Status: DISCONTINUED | OUTPATIENT
Start: 2021-02-24 | End: 2021-02-24 | Stop reason: HOSPADM

## 2021-02-24 RX ORDER — HYDRALAZINE HYDROCHLORIDE 20 MG/ML
5 INJECTION INTRAMUSCULAR; INTRAVENOUS EVERY 10 MIN PRN
Status: DISCONTINUED | OUTPATIENT
Start: 2021-02-24 | End: 2021-02-24 | Stop reason: HOSPADM

## 2021-02-24 RX ORDER — MIDAZOLAM HYDROCHLORIDE 1 MG/ML
INJECTION INTRAMUSCULAR; INTRAVENOUS PRN
Status: DISCONTINUED | OUTPATIENT
Start: 2021-02-24 | End: 2021-02-24 | Stop reason: SDUPTHER

## 2021-02-24 RX ORDER — PROCHLORPERAZINE EDISYLATE 5 MG/ML
5 INJECTION INTRAMUSCULAR; INTRAVENOUS
Status: DISCONTINUED | OUTPATIENT
Start: 2021-02-24 | End: 2021-02-24 | Stop reason: HOSPADM

## 2021-02-24 RX ORDER — LIDOCAINE HYDROCHLORIDE 20 MG/ML
INJECTION, SOLUTION EPIDURAL; INFILTRATION; INTRACAUDAL; PERINEURAL PRN
Status: DISCONTINUED | OUTPATIENT
Start: 2021-02-24 | End: 2021-02-24 | Stop reason: SDUPTHER

## 2021-02-24 RX ORDER — ONDANSETRON 2 MG/ML
4 INJECTION INTRAMUSCULAR; INTRAVENOUS
Status: DISCONTINUED | OUTPATIENT
Start: 2021-02-24 | End: 2021-02-24 | Stop reason: HOSPADM

## 2021-02-24 RX ORDER — PROPOFOL 10 MG/ML
INJECTION, EMULSION INTRAVENOUS PRN
Status: DISCONTINUED | OUTPATIENT
Start: 2021-02-24 | End: 2021-02-24 | Stop reason: SDUPTHER

## 2021-02-24 RX ORDER — MEPERIDINE HYDROCHLORIDE 25 MG/ML
12.5 INJECTION INTRAMUSCULAR; INTRAVENOUS; SUBCUTANEOUS EVERY 5 MIN PRN
Status: DISCONTINUED | OUTPATIENT
Start: 2021-02-24 | End: 2021-02-24 | Stop reason: HOSPADM

## 2021-02-24 RX ORDER — MAGNESIUM SULFATE IN WATER 40 MG/ML
2000 INJECTION, SOLUTION INTRAVENOUS ONCE
Status: COMPLETED | OUTPATIENT
Start: 2021-02-24 | End: 2021-02-24

## 2021-02-24 RX ORDER — POTASSIUM CHLORIDE 7.45 MG/ML
10 INJECTION INTRAVENOUS
Status: COMPLETED | OUTPATIENT
Start: 2021-02-24 | End: 2021-02-24

## 2021-02-24 RX ORDER — OXYCODONE HYDROCHLORIDE AND ACETAMINOPHEN 5; 325 MG/1; MG/1
2 TABLET ORAL PRN
Status: DISCONTINUED | OUTPATIENT
Start: 2021-02-24 | End: 2021-02-24 | Stop reason: HOSPADM

## 2021-02-24 RX ORDER — ONDANSETRON 2 MG/ML
INJECTION INTRAMUSCULAR; INTRAVENOUS PRN
Status: DISCONTINUED | OUTPATIENT
Start: 2021-02-24 | End: 2021-02-24 | Stop reason: SDUPTHER

## 2021-02-24 RX ORDER — FENTANYL CITRATE 50 UG/ML
50 INJECTION, SOLUTION INTRAMUSCULAR; INTRAVENOUS EVERY 5 MIN PRN
Status: DISCONTINUED | OUTPATIENT
Start: 2021-02-24 | End: 2021-02-24 | Stop reason: HOSPADM

## 2021-02-24 RX ORDER — LABETALOL HYDROCHLORIDE 5 MG/ML
5 INJECTION, SOLUTION INTRAVENOUS EVERY 10 MIN PRN
Status: DISCONTINUED | OUTPATIENT
Start: 2021-02-24 | End: 2021-02-24 | Stop reason: HOSPADM

## 2021-02-24 RX ORDER — HYDROMORPHONE HCL 110MG/55ML
PATIENT CONTROLLED ANALGESIA SYRINGE INTRAVENOUS PRN
Status: DISCONTINUED | OUTPATIENT
Start: 2021-02-24 | End: 2021-02-24 | Stop reason: SDUPTHER

## 2021-02-24 RX ADMIN — POTASSIUM CHLORIDE 10 MEQ: 10 INJECTION, SOLUTION INTRAVENOUS at 06:33

## 2021-02-24 RX ADMIN — POTASSIUM CHLORIDE 10 MEQ: 10 INJECTION, SOLUTION INTRAVENOUS at 11:03

## 2021-02-24 RX ADMIN — PROPOFOL 50 MG: 10 INJECTION, EMULSION INTRAVENOUS at 12:24

## 2021-02-24 RX ADMIN — HYDROMORPHONE HYDROCHLORIDE 0.5 MG: 2 INJECTION, SOLUTION INTRAMUSCULAR; INTRAVENOUS; SUBCUTANEOUS at 12:47

## 2021-02-24 RX ADMIN — ONDANSETRON 4 MG: 2 INJECTION INTRAMUSCULAR; INTRAVENOUS at 12:09

## 2021-02-24 RX ADMIN — DEXAMETHASONE SODIUM PHOSPHATE 8 MG: 4 INJECTION, SOLUTION INTRAMUSCULAR; INTRAVENOUS at 12:09

## 2021-02-24 RX ADMIN — HYDROMORPHONE HYDROCHLORIDE 0.5 MG: 1 INJECTION, SOLUTION INTRAMUSCULAR; INTRAVENOUS; SUBCUTANEOUS at 23:02

## 2021-02-24 RX ADMIN — PROPOFOL 50 MG: 10 INJECTION, EMULSION INTRAVENOUS at 13:12

## 2021-02-24 RX ADMIN — SODIUM CHLORIDE, SODIUM LACTATE, POTASSIUM CHLORIDE, AND CALCIUM CHLORIDE: .6; .31; .03; .02 INJECTION, SOLUTION INTRAVENOUS at 12:09

## 2021-02-24 RX ADMIN — PROPOFOL 50 MG: 10 INJECTION, EMULSION INTRAVENOUS at 12:32

## 2021-02-24 RX ADMIN — FENTANYL CITRATE 100 MCG: 50 INJECTION, SOLUTION INTRAMUSCULAR; INTRAVENOUS at 12:02

## 2021-02-24 RX ADMIN — POTASSIUM CHLORIDE 10 MEQ: 10 INJECTION, SOLUTION INTRAVENOUS at 14:47

## 2021-02-24 RX ADMIN — TAMSULOSIN HYDROCHLORIDE 0.4 MG: 0.4 CAPSULE ORAL at 21:17

## 2021-02-24 RX ADMIN — HYDROMORPHONE HYDROCHLORIDE 0.5 MG: 2 INJECTION, SOLUTION INTRAMUSCULAR; INTRAVENOUS; SUBCUTANEOUS at 12:51

## 2021-02-24 RX ADMIN — PROPOFOL 50 MG: 10 INJECTION, EMULSION INTRAVENOUS at 12:03

## 2021-02-24 RX ADMIN — FENTANYL CITRATE 25 MCG: 50 INJECTION, SOLUTION INTRAMUSCULAR; INTRAVENOUS at 12:24

## 2021-02-24 RX ADMIN — FENTANYL CITRATE 25 MCG: 50 INJECTION, SOLUTION INTRAMUSCULAR; INTRAVENOUS at 12:31

## 2021-02-24 RX ADMIN — HYDROMORPHONE HYDROCHLORIDE 0.5 MG: 2 INJECTION, SOLUTION INTRAMUSCULAR; INTRAVENOUS; SUBCUTANEOUS at 13:12

## 2021-02-24 RX ADMIN — MIDAZOLAM HYDROCHLORIDE 2 MG: 2 INJECTION, SOLUTION INTRAMUSCULAR; INTRAVENOUS at 11:56

## 2021-02-24 RX ADMIN — POTASSIUM CHLORIDE 10 MEQ: 10 INJECTION, SOLUTION INTRAVENOUS at 09:01

## 2021-02-24 RX ADMIN — PROPOFOL 200 MG: 10 INJECTION, EMULSION INTRAVENOUS at 12:02

## 2021-02-24 RX ADMIN — FENTANYL CITRATE 25 MCG: 50 INJECTION, SOLUTION INTRAMUSCULAR; INTRAVENOUS at 12:39

## 2021-02-24 RX ADMIN — FENTANYL CITRATE 25 MCG: 50 INJECTION, SOLUTION INTRAMUSCULAR; INTRAVENOUS at 12:36

## 2021-02-24 RX ADMIN — MAGNESIUM SULFATE HEPTAHYDRATE 2000 MG: 40 INJECTION, SOLUTION INTRAVENOUS at 06:33

## 2021-02-24 RX ADMIN — LIDOCAINE HYDROCHLORIDE 100 MG: 20 INJECTION, SOLUTION EPIDURAL; INFILTRATION; INTRACAUDAL; PERINEURAL at 12:02

## 2021-02-24 RX ADMIN — PANTOPRAZOLE SODIUM 40 MG: 40 INJECTION, POWDER, FOR SOLUTION INTRAVENOUS at 06:45

## 2021-02-24 RX ADMIN — SODIUM CHLORIDE, POTASSIUM CHLORIDE, SODIUM LACTATE AND CALCIUM CHLORIDE: 600; 310; 30; 20 INJECTION, SOLUTION INTRAVENOUS at 16:30

## 2021-02-24 RX ADMIN — SODIUM CHLORIDE, POTASSIUM CHLORIDE, SODIUM LACTATE AND CALCIUM CHLORIDE: 600; 310; 30; 20 INJECTION, SOLUTION INTRAVENOUS at 03:33

## 2021-02-24 RX ADMIN — ACETAMINOPHEN 1000 MG: 500 TABLET ORAL at 20:05

## 2021-02-24 RX ADMIN — POTASSIUM CHLORIDE 10 MEQ: 10 INJECTION, SOLUTION INTRAVENOUS at 10:04

## 2021-02-24 RX ADMIN — PROPOFOL 50 MG: 10 INJECTION, EMULSION INTRAVENOUS at 12:57

## 2021-02-24 ASSESSMENT — PULMONARY FUNCTION TESTS
PIF_VALUE: 4
PIF_VALUE: 5
PIF_VALUE: 3
PIF_VALUE: 2
PIF_VALUE: 3
PIF_VALUE: 4
PIF_VALUE: 5
PIF_VALUE: 5
PIF_VALUE: 2
PIF_VALUE: 1
PIF_VALUE: 23
PIF_VALUE: 3
PIF_VALUE: 5
PIF_VALUE: 6
PIF_VALUE: 4
PIF_VALUE: 3
PIF_VALUE: 5
PIF_VALUE: 4
PIF_VALUE: 1
PIF_VALUE: 0
PIF_VALUE: 4
PIF_VALUE: 5
PIF_VALUE: 4
PIF_VALUE: 3
PIF_VALUE: 3
PIF_VALUE: 5
PIF_VALUE: 4
PIF_VALUE: 4
PIF_VALUE: 3
PIF_VALUE: 5
PIF_VALUE: 3
PIF_VALUE: 4
PIF_VALUE: 6
PIF_VALUE: 4
PIF_VALUE: 1
PIF_VALUE: 4
PIF_VALUE: 4
PIF_VALUE: 3
PIF_VALUE: 15
PIF_VALUE: 13
PIF_VALUE: 5

## 2021-02-24 ASSESSMENT — LIFESTYLE VARIABLES: SMOKING_STATUS: 0

## 2021-02-24 ASSESSMENT — PAIN DESCRIPTION - PAIN TYPE
TYPE: SURGICAL PAIN
TYPE: SURGICAL PAIN

## 2021-02-24 ASSESSMENT — PAIN DESCRIPTION - ONSET
ONSET: GRADUAL
ONSET: GRADUAL

## 2021-02-24 ASSESSMENT — PAIN DESCRIPTION - LOCATION
LOCATION: RECTUM
LOCATION: RECTUM

## 2021-02-24 ASSESSMENT — PAIN DESCRIPTION - FREQUENCY: FREQUENCY: INTERMITTENT

## 2021-02-24 ASSESSMENT — ENCOUNTER SYMPTOMS: SHORTNESS OF BREATH: 0

## 2021-02-24 ASSESSMENT — PAIN SCALES - GENERAL
PAINLEVEL_OUTOF10: 0
PAINLEVEL_OUTOF10: 0
PAINLEVEL_OUTOF10: 2
PAINLEVEL_OUTOF10: 1

## 2021-02-24 ASSESSMENT — PAIN DESCRIPTION - DESCRIPTORS: DESCRIPTORS: DISCOMFORT

## 2021-02-24 ASSESSMENT — PAIN DESCRIPTION - PROGRESSION: CLINICAL_PROGRESSION: NOT CHANGED

## 2021-02-24 NOTE — CARE COORDINATION
Cm following, pt from home with family Ind with ADLs pta. Pt plan OR today, cont to trend H&H 7.7 today. Did not think he would have needs at [facility].   Electronically signed by Hang Engle RN on 2/24/2021 at 9:45 AM  [phone]

## 2021-02-24 NOTE — OP NOTE
Operative Note      Patient: Barry Urbina II  YOB: 1970  MRN: [id number]    Date of Procedure: 2/24/2021    Pre-Op Diagnosis: BLEEDING HEMORRHOID    Post-Op Diagnosis: SAME       Procedure(s):  EXAM UNDER ANESTHESIA, HEMORRHOIDECTOMY X2    Surgeon(s):  Olga Jimenez MD    Assistant:    Resident: Alexsandra Ron DO    Anesthesia: General    Estimated Blood Loss (mL): 750 cc     Complications: None    Specimens:   ID Type Source Tests Collected by Time Destination   A : HEMORRHOIDS Tissue Tissue SURGICAL PATHOLOGY Olga Jimenez MD 2/24/2021 1236          Findings: bleeding hemorrhoid. 2 column hemorrhoidectomy performed     OP NOTE     After informed consent was obtained the patient was taken to the operating room. General anesthesia was given. Time out was called to confirm key components. The patient was placed in the lithotomy position with appropriate padding. The patient was then prepped and draped in the usual sterile fashion. We saw two large bleeding hemorrhoid columns: left posterior and right anterior. These had thickening and whitening of the mucosa consistent with chronic prolapse. We placed a hemorrhoid clamp on the left lesion and elevated it away from the underlying structures. We then divided the skin with a #15 scalpel and then the mucosa using a Ligasure device blade. A 3-0 chromic was then used in a running locking fashion to close the defect with care taken to approximate mucosa to mucosa and skin to skin. There was some small residual proximal tissue that was removed by Ligasure. There was slight oozing from the proximal aspect of the wound that was controlled with additional chromic stitch. The right sided lesion was removed in similar fashion. Good hemostasis was seen. We injected Exparel for post operative pain control. A rolled gelfoam + thrombin was placed in the anus to promote hemostasis. No other bleeding hemorrhoids were seen.  The patient did have a very friable anal canal and seemed to bleed easily. We also noted bleeding from the needle entry points when injected the local. This resolved with pressure but we will plan for Hematology consult post op.      Dr. Mahsa Delgado was present and scrubbed and performed all aspects of the operation      Electronically signed by Todd Calvin MD on 2/24/2021 at 2:13 PM

## 2021-02-24 NOTE — BRIEF OP NOTE
Brief Postoperative Note      Patient: Qian Romero II  YOB: 1970  MRN: [id number]    Date of Procedure: 2/24/2021    Pre-Op Diagnosis: BLEEDING HEMORRHOID    Post-Op Diagnosis: SAME       Procedure(s):  EXAM UNDER ANESTHESIA, HEMORRHOIDECTOMY X2    Surgeon(s):  Alexis Chacon MD    Assistant:    Resident: Hero Coburn DO    Anesthesia: General    Estimated Blood Loss (mL): 974 cc     Complications: None    Specimens:   ID Type Source Tests Collected by Time Destination   A : HEMORRHOIDS Tissue Tissue SURGICAL PATHOLOGY Alexis Chacon MD 2/24/2021 1236          Findings: bleeding hemorrhoid.  2 column hemorrhoidectomy performed     Electronically signed by Alexis Chacon MD on 2/24/2021 at 2:13 PM

## 2021-02-24 NOTE — PROGRESS NOTES
Pt had blood BM. RN informed Dr Meaghan Phillips.     Electronically signed by Leny Sung on 2/24/2021 at 8:23 AM
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
## 2021-02-24 NOTE — PROGRESS NOTES
VSS. Pt denies pain this shift. No reports of rectal bleeding this shift. Voiding throughout night. Pt NPO at midnight with IVF infusing. Pt up ad joo with steady gait. Pt able to make needs known. Will monitor.

## 2021-02-24 NOTE — PLAN OF CARE
Problem: Bleeding:  Goal: Will show no signs and symptoms of excessive bleeding  Description: Will show no signs and symptoms of excessive bleeding  Outcome: Ongoing   No reports of rectal bleeding throughout the night. H&H stable.

## 2021-02-24 NOTE — PROGRESS NOTES
Pt returned from pacu. VSS, weaned to 1L nc. Instructed on I.S. States pain is well controlled. Repeat CBC to be drawn at 1600. Tolerating ice chips, will advance as to fulls as tolerated. Wife at bedside.      BP (!) 144/78   Pulse 83   Temp 98.7 °F (37.1 °C) (Oral)   Resp 17   Ht 6' 2\" (1.88 m)   Wt 253 lb (114.8 kg)   SpO2 100%   BMI 32.48 kg/m²

## 2021-02-24 NOTE — ANESTHESIA POSTPROCEDURE EVALUATION
Department of Anesthesiology  Postprocedure Note    Patient: Yvette Marquez  MRN: [id number]  YOB: 1970  Date of evaluation: 2/24/2021  Time:  2:50 PM     Procedure Summary     Date: 02/24/21 Room / Location: [facility] [address] / [facility]    Anesthesia Start: 3279 Anesthesia Stop: 5519    Procedure: EXAM UNDER ANESTHESIA, HEMORRHOIDECTOMY X2 (N/A ) Diagnosis: (BLEEDING HEMORRHOID)    Surgeons: Raina Cedeno MD Responsible Provider: Talya Da Silva MD    Anesthesia Type: general ASA Status: 2          Anesthesia Type: general    Ramon Phase I: Ramon Score: 9    Ramon Phase II:      Last vitals: Reviewed and per EMR flowsheets.        Anesthesia Post Evaluation    Patient location during evaluation: PACU  Patient participation: complete - patient participated  Level of consciousness: awake  Pain score: 2  Airway patency: patent  Nausea & Vomiting: no nausea and no vomiting  Complications: no  Cardiovascular status: blood pressure returned to baseline  Respiratory status: acceptable  Hydration status: euvolemic

## 2021-02-24 NOTE — PROGRESS NOTES
Department of Surgery:  Post-op Note    CC: Rectal bleeding    Subjective:   Pain is controlled   Denies nausea or vomiting  Tolerating full liquids  Unable to void after surgery, straight cathed one time    Objective:  Anesthesia type: General      I/O    Intra op    Post op     Fluids  1500 mL 100 mL/hr     EBL  mL 0 mL     Urine 100 mL 800 (cath)     Physical Exam:  Vitals:    02/24/21 1350 02/24/21 1400 02/24/21 1415 02/24/21 1444   BP: 124/69 124/76 119/74 (!) 144/78   Pulse: 78 77 83 83   Resp: 14 10 14 17   Temp:   98 °F (36.7 °C) 98.7 °F (37.1 °C)   TempSrc:    Oral   SpO2: 96% 96% 94% 100%   Weight:       Height:           General appearance: alert, no acute distress, grooming appropriate  Chest/Lungs: CTAB, no crackles/rales, wheezes/rhonchi, normal effort  Cardiovascular: RRR, no murmurs/gallops/rubs  Abdomen: soft, non tender, non-distended  : Grossly normal  Extremities: no edema, no cyanosis  Neuro: A&Ox3, no focal deficits, sensation intact    Assessment and Plan  This is a 53y.o. year old male with a diagnosis of GI bleed after hemorrhoid banding s/p 2 column hemorrhoidectomy POD #0    Pain management: Dilaudid and tylenol  Cardiovascular: RRR  Respiratory: extubated, encourage hourly incentive spirometry and deep breathing  FEN:  Fluids: 100 LR, Diet: Full liquids  : Patient had episode of urinary retention and required straight cath  Wound: fluffs and ABD to anus, can be changed as needed  Ambulation: OOB to chair, encourage ambulation  Prophylaxis: SCDs, holding Lovenox until hgb stable    Hosie Kamaljit, DO  02/24/21  6:55 PM

## 2021-02-24 NOTE — PROGRESS NOTES
performed a history and physical examination of the patient and discussed management with the resident. I reviewed the resident's note and agree with the documented findings and plan of care.     Bernabe Reno M.D.  2/24/21   2:07 PM

## 2021-02-24 NOTE — ANESTHESIA PRE PROCEDURE
Department of Anesthesiology  Preprocedure Note       Name:  Scott Dang II   Age:  53 y.o.  :  1970                                          MRN:  [id number]         Date:  2021      Surgeon: Sunshine Valiente):  Melissa Mcdowell MD    Procedure: Procedure(s):  EXAM UNDER ANESTHESIA, POSSIBLE HEMORRHOIDECTOMY, CONTROL OF BLEEDING    Medications prior to admission:   Prior to Admission medications    Medication Sig Start Date End Date Taking? Authorizing Provider   Multiple Vitamins-Minerals (THERAPEUTIC MULTIVITAMIN-MINERALS) tablet Take 1 tablet by mouth daily    Historical Provider, MD   Ascorbic Acid (VITAMIN C) 250 MG tablet Take 1,000 mg by mouth daily    Historical Provider, MD   albuterol (PROVENTIL;VENTOLIN) 2 MG/5ML syrup Take 2 mg by mouth 3 times daily    Historical Provider, MD   albuterol (ACCUNEB) 1.25 MG/3ML nebulizer solution Inhale 1 ampule into the lungs every 4 hours as needed for Wheezing    Historical Provider, MD   hydrocortisone 1 % ointment Apply topically 2 times daily AN formula 3 (HC)    Historical Provider, MD   Omega 3 1200 MG CAPS Take by mouth 2 caps    Historical Provider, MD       Current medications:    No current facility-administered medications for this visit. No current outpatient medications on file.      Facility-Administered Medications Ordered in Other Visits   Medication Dose Route Frequency Provider Last Rate Last Admin    potassium chloride 10 mEq/100 mL IVPB (Peripheral Line)  10 mEq Intravenous Q1H James Calero,  mL/hr at 21 1103 10 mEq at 21 1103    pantoprazole (PROTONIX) injection 40 mg  40 mg Intravenous QAM AC James Calero DO   40 mg at 21 0645    sodium chloride flush 0.9 % injection 10 mL  10 mL Intravenous PRN Fan Calero, DO        lactated ringers infusion   Intravenous Continuous Fan Calero,  mL/hr at 21 7413 New Bag at 21 8095  acetaminophen (TYLENOL) tablet 1,000 mg  1,000 mg Oral Q6H PRN Raina Calero, DO   1,000 mg at 02/23/21 1755       Allergies:  No Known Allergies    Problem List:    Patient Active Problem List   Diagnosis Code    BRBPR (bright red blood per rectum) K62.5    Lower GI bleed K92.2    Anemia due to acute blood loss D62    GI bleed K92.2       Past Medical History:        Diagnosis Date    Asthma     COVID-19 01/29/2021    Hypertension        Past Surgical History:        Procedure Laterality Date    BONY PELVIS SURGERY      COLONOSCOPY      polyp removal    COLONOSCOPY N/A 2/22/2021    COLONOSCOPY POLYPECTOMY SNARE/COLD BIOPSY performed by Lavonne Lilly MD at [facility] COLONOSCOPY N/A 2/22/2021    COLONOSCOPY CONTROL HEMORRHAGE performed by Lavonne Lilly MD at [facility] GASTROINTESTINAL ENDOSCOPY N/A 2/22/2021    EGD W/ANES. (13:30) + COVID 1/29 performed by Lavonne Lilly MD at [facility]. [address] History:    Social History     Tobacco Use    Smoking status: Never Smoker    Smokeless tobacco: Never Used   Substance Use Topics    Alcohol use: Never     Frequency: Never                                Counseling given: Not Answered      Vital Signs (Current): There were no vitals filed for this visit.                                            BP Readings from Last 3 Encounters:   02/24/21 (!) 155/94   02/24/21 (!) 156/83   02/23/21 (!) 148/76       NPO Status:                                                                                 BMI:   Wt Readings from Last 3 Encounters:   02/24/21 253 lb (114.8 kg)   02/23/21 253 lb (114.8 kg)     There is no height or weight on file to calculate BMI.    CBC:   Lab Results   Component Value Date    WBC 4.0 02/24/2021    RBC 2.69 02/24/2021    HGB 7.7 02/24/2021    HCT 22.5 02/24/2021    MCV 83.8 02/24/2021 RDW 15.9 02/24/2021     02/24/2021       CMP:   Lab Results   Component Value Date     02/24/2021    K 3.5 02/24/2021    K 4.5 02/21/2021     02/24/2021    CO2 25 02/24/2021    BUN 6 02/24/2021    CREATININE 0.9 02/24/2021    GFRAA >60 02/24/2021    AGRATIO 1.9 02/21/2021    LABGLOM >60 02/24/2021    GLUCOSE 100 02/24/2021    PROT 6.1 02/21/2021    CALCIUM 8.3 02/24/2021    BILITOT 0.5 02/21/2021    ALKPHOS 50 02/21/2021    AST 19 02/21/2021    ALT 21 02/21/2021       POC Tests: No results for input(s): POCGLU, POCNA, POCK, POCCL, POCBUN, POCHEMO, POCHCT in the last 72 hours. Coags:   Lab Results   Component Value Date    PROTIME 12.6 02/23/2021    INR 1.09 02/23/2021    APTT 27.0 02/20/2021       HCG (If Applicable): No results found for: PREGTESTUR, PREGSERUM, HCG, HCGQUANT     ABGs: No results found for: PHART, PO2ART, OBC3TBH, FZG5VSV, BEART, [id number]     Type & Screen (If Applicable):  No results found for: LABABO, LABRH    Drug/Infectious Status (If Applicable):  No results found for: HIV, HEPCAB    COVID-19 Screening (If Applicable):   Lab Results   Component Value Date    COVID19 POSITIVE 01/29/2021         Anesthesia Evaluation  Patient summary reviewed no history of anesthetic complications:   Airway: Mallampati: II  TM distance: >3 FB   Neck ROM: full  Mouth opening: > = 3 FB Dental:          Pulmonary: breath sounds clear to auscultation  (+) asthma (rare prn inhaler):     (-) COPD, shortness of breath, recent URI, sleep apnea and not a current smoker          Patient did not smoke on day of surgery.                  Cardiovascular:    (+) hypertension (no meds current):,     (-) pacemaker, past MI, CAD, CABG/stent, dysrhythmias,  angina,  CHF and orthopnea    ECG reviewed  Rhythm: regular  Rate: normal           Beta Blocker:  Not on Beta Blocker         Neuro/Psych:   Negative Neuro/Psych ROS     (-) seizures, TIA, CVA and psychiatric history           GI/Hepatic/Renal: (+) bowel prep,      (-) GERD, no renal disease and no morbid obesity       Endo/Other:    (+) blood dyscrasia::., no malignancy/cancer. (-) diabetes mellitus, hypothyroidism, hyperthyroidism, arthritis, no malignancy/cancer               Abdominal:           Vascular:                                          Anesthesia Plan      general     ASA 2     (-npo MN  -denies chest pain or palpitations)  Induction: intravenous. MIPS: Prophylactic antiemetics administered. Anesthetic plan and risks discussed with patient. Plan discussed with CRNA. This pre-anesthesia assessment may be used as a history and physical.    DOS STAFF ADDENDUM:    Pt seen and examined, chart reviewed (including anesthesia, drug and allergy history). No interval changes to history and physical examination. Anesthetic plan, risks, benefits, alternatives, and personnel involved discussed with patient. Patient verbalized an understanding and agrees to proceed.       Amanda Saenz MD  February 24, 2021  12:02 PM          Amanda Saenz MD   2/24/2021

## 2021-02-25 LAB
ALBUMIN SERPL-MCNC: 4 G/DL (ref 3.4–5)
ANION GAP SERPL CALCULATED.3IONS-SCNC: 13 MMOL/L (ref 3–16)
BASOPHILS ABSOLUTE: 0 K/UL (ref 0–0.2)
BASOPHILS RELATIVE PERCENT: 0.1 %
BUN BLDV-MCNC: 8 MG/DL (ref 7–20)
CALCIUM SERPL-MCNC: 8.7 MG/DL (ref 8.3–10.6)
CHLORIDE BLD-SCNC: 102 MMOL/L (ref 99–110)
CO2: 22 MMOL/L (ref 21–32)
CREAT SERPL-MCNC: 1.3 MG/DL (ref 0.9–1.3)
EOSINOPHILS ABSOLUTE: 0 K/UL (ref 0–0.6)
EOSINOPHILS RELATIVE PERCENT: 0.2 %
GFR AFRICAN AMERICAN: >60
GFR NON-AFRICAN AMERICAN: 59
GLUCOSE BLD-MCNC: 183 MG/DL (ref 70–99)
HCT VFR BLD CALC: 25.6 % (ref 40.5–52.5)
HEMOGLOBIN: 7.4 G/DL (ref 13.5–17.5)
HEMOGLOBIN: 8.3 G/DL (ref 13.5–17.5)
LYMPHOCYTES ABSOLUTE: 1.2 K/UL (ref 1–5.1)
LYMPHOCYTES RELATIVE PERCENT: 9.4 %
MAGNESIUM: 1.8 MG/DL (ref 1.8–2.4)
MCH RBC QN AUTO: 27.2 PG (ref 26–34)
MCHC RBC AUTO-ENTMCNC: 32.3 G/DL (ref 31–36)
MCV RBC AUTO: 84.3 FL (ref 80–100)
MONOCYTES ABSOLUTE: 1 K/UL (ref 0–1.3)
MONOCYTES RELATIVE PERCENT: 8 %
NEUTROPHILS ABSOLUTE: 10.1 K/UL (ref 1.7–7.7)
NEUTROPHILS RELATIVE PERCENT: 82.3 %
PDW BLD-RTO: 16.4 % (ref 12.4–15.4)
PHOSPHORUS: 2.9 MG/DL (ref 2.5–4.9)
PLATELET # BLD: 280 K/UL (ref 135–450)
PMV BLD AUTO: 7.3 FL (ref 5–10.5)
POTASSIUM SERPL-SCNC: 3.6 MMOL/L (ref 3.5–5.1)
RBC # BLD: 3.04 M/UL (ref 4.2–5.9)
SODIUM BLD-SCNC: 137 MMOL/L (ref 136–145)
WBC # BLD: 12.3 K/UL (ref 4–11)

## 2021-02-25 PROCEDURE — C9113 INJ PANTOPRAZOLE SODIUM, VIA: HCPCS | Performed by: STUDENT IN AN ORGANIZED HEALTH CARE EDUCATION/TRAINING PROGRAM

## 2021-02-25 PROCEDURE — 94664 DEMO&/EVAL PT USE INHALER: CPT

## 2021-02-25 PROCEDURE — 51798 US URINE CAPACITY MEASURE: CPT

## 2021-02-25 PROCEDURE — 2580000003 HC RX 258: Performed by: STUDENT IN AN ORGANIZED HEALTH CARE EDUCATION/TRAINING PROGRAM

## 2021-02-25 PROCEDURE — 6370000000 HC RX 637 (ALT 250 FOR IP): Performed by: STUDENT IN AN ORGANIZED HEALTH CARE EDUCATION/TRAINING PROGRAM

## 2021-02-25 PROCEDURE — 1200000000 HC SEMI PRIVATE

## 2021-02-25 PROCEDURE — 6360000002 HC RX W HCPCS: Performed by: STUDENT IN AN ORGANIZED HEALTH CARE EDUCATION/TRAINING PROGRAM

## 2021-02-25 PROCEDURE — 36415 COLL VENOUS BLD VENIPUNCTURE: CPT

## 2021-02-25 PROCEDURE — 83735 ASSAY OF MAGNESIUM: CPT

## 2021-02-25 PROCEDURE — 85025 COMPLETE CBC W/AUTO DIFF WBC: CPT

## 2021-02-25 PROCEDURE — 80069 RENAL FUNCTION PANEL: CPT

## 2021-02-25 RX ORDER — 0.9 % SODIUM CHLORIDE 0.9 %
1000 INTRAVENOUS SOLUTION INTRAVENOUS ONCE
Status: COMPLETED | OUTPATIENT
Start: 2021-02-25 | End: 2021-02-25

## 2021-02-25 RX ORDER — MAGNESIUM SULFATE IN WATER 40 MG/ML
2000 INJECTION, SOLUTION INTRAVENOUS ONCE
Status: COMPLETED | OUTPATIENT
Start: 2021-02-25 | End: 2021-02-25

## 2021-02-25 RX ORDER — OXYCODONE HYDROCHLORIDE 5 MG/1
10 TABLET ORAL EVERY 4 HOURS PRN
Status: DISCONTINUED | OUTPATIENT
Start: 2021-02-25 | End: 2021-02-26 | Stop reason: HOSPADM

## 2021-02-25 RX ORDER — DIAZEPAM 5 MG/1
5 TABLET ORAL EVERY 6 HOURS PRN
Status: DISCONTINUED | OUTPATIENT
Start: 2021-02-25 | End: 2021-02-26 | Stop reason: HOSPADM

## 2021-02-25 RX ORDER — ALBUTEROL SULFATE 2.5 MG/3ML
2.5 SOLUTION RESPIRATORY (INHALATION) 3 TIMES DAILY PRN
Status: DISCONTINUED | OUTPATIENT
Start: 2021-02-25 | End: 2021-02-26 | Stop reason: HOSPADM

## 2021-02-25 RX ORDER — OXYCODONE HYDROCHLORIDE 5 MG/1
5 TABLET ORAL EVERY 4 HOURS PRN
Status: DISCONTINUED | OUTPATIENT
Start: 2021-02-25 | End: 2021-02-26 | Stop reason: HOSPADM

## 2021-02-25 RX ORDER — DOCUSATE SODIUM 100 MG/1
100 CAPSULE, LIQUID FILLED ORAL 2 TIMES DAILY
Status: DISCONTINUED | OUTPATIENT
Start: 2021-02-25 | End: 2021-02-26 | Stop reason: HOSPADM

## 2021-02-25 RX ORDER — POLYETHYLENE GLYCOL 3350 17 G/17G
17 POWDER, FOR SOLUTION ORAL DAILY
Status: DISCONTINUED | OUTPATIENT
Start: 2021-02-25 | End: 2021-02-26 | Stop reason: HOSPADM

## 2021-02-25 RX ORDER — POTASSIUM CHLORIDE 20 MEQ/1
40 TABLET, EXTENDED RELEASE ORAL ONCE
Status: COMPLETED | OUTPATIENT
Start: 2021-02-25 | End: 2021-02-25

## 2021-02-25 RX ORDER — PANTOPRAZOLE SODIUM 40 MG/1
40 TABLET, DELAYED RELEASE ORAL
Status: DISCONTINUED | OUTPATIENT
Start: 2021-02-25 | End: 2021-02-26 | Stop reason: HOSPADM

## 2021-02-25 RX ORDER — ACETAMINOPHEN 500 MG
1000 TABLET ORAL EVERY 6 HOURS
Status: DISCONTINUED | OUTPATIENT
Start: 2021-02-25 | End: 2021-02-26 | Stop reason: HOSPADM

## 2021-02-25 RX ADMIN — ACETAMINOPHEN 1000 MG: 500 TABLET ORAL at 23:10

## 2021-02-25 RX ADMIN — POTASSIUM CHLORIDE 40 MEQ: 1500 TABLET, EXTENDED RELEASE ORAL at 10:57

## 2021-02-25 RX ADMIN — ACETAMINOPHEN 1000 MG: 500 TABLET ORAL at 16:24

## 2021-02-25 RX ADMIN — TAMSULOSIN HYDROCHLORIDE 0.4 MG: 0.4 CAPSULE ORAL at 09:16

## 2021-02-25 RX ADMIN — PANTOPRAZOLE SODIUM 40 MG: 40 INJECTION, POWDER, FOR SOLUTION INTRAVENOUS at 06:13

## 2021-02-25 RX ADMIN — DOCUSATE SODIUM 100 MG: 100 CAPSULE, LIQUID FILLED ORAL at 09:16

## 2021-02-25 RX ADMIN — DOCUSATE SODIUM 100 MG: 100 CAPSULE, LIQUID FILLED ORAL at 22:12

## 2021-02-25 RX ADMIN — OXYCODONE 5 MG: 5 TABLET ORAL at 22:06

## 2021-02-25 RX ADMIN — MAGNESIUM SULFATE HEPTAHYDRATE 2000 MG: 40 INJECTION, SOLUTION INTRAVENOUS at 09:16

## 2021-02-25 RX ADMIN — HYDROMORPHONE HYDROCHLORIDE 1 MG: 1 INJECTION, SOLUTION INTRAMUSCULAR; INTRAVENOUS; SUBCUTANEOUS at 02:05

## 2021-02-25 RX ADMIN — ACETAMINOPHEN 1000 MG: 500 TABLET ORAL at 10:56

## 2021-02-25 RX ADMIN — SODIUM CHLORIDE 1000 ML: 9 INJECTION, SOLUTION INTRAVENOUS at 06:53

## 2021-02-25 RX ADMIN — OXYCODONE 5 MG: 5 TABLET ORAL at 13:57

## 2021-02-25 RX ADMIN — Medication 10 ML: at 22:06

## 2021-02-25 RX ADMIN — DIAZEPAM 5 MG: 5 TABLET ORAL at 22:06

## 2021-02-25 RX ADMIN — OXYCODONE 5 MG: 5 TABLET ORAL at 09:16

## 2021-02-25 RX ADMIN — OXYCODONE 5 MG: 5 TABLET ORAL at 18:08

## 2021-02-25 RX ADMIN — ACETAMINOPHEN 1000 MG: 500 TABLET ORAL at 04:34

## 2021-02-25 RX ADMIN — POLYETHYLENE GLYCOL 3350 17 G: 17 POWDER, FOR SOLUTION ORAL at 09:16

## 2021-02-25 RX ADMIN — HYDROMORPHONE HYDROCHLORIDE 1 MG: 1 INJECTION, SOLUTION INTRAMUSCULAR; INTRAVENOUS; SUBCUTANEOUS at 06:13

## 2021-02-25 ASSESSMENT — PAIN DESCRIPTION - LOCATION
LOCATION: RECTUM

## 2021-02-25 ASSESSMENT — PAIN DESCRIPTION - PAIN TYPE
TYPE: SURGICAL PAIN

## 2021-02-25 ASSESSMENT — PAIN SCALES - GENERAL
PAINLEVEL_OUTOF10: 4
PAINLEVEL_OUTOF10: 4
PAINLEVEL_OUTOF10: 5
PAINLEVEL_OUTOF10: 5
PAINLEVEL_OUTOF10: 6
PAINLEVEL_OUTOF10: 5
PAINLEVEL_OUTOF10: 5
PAINLEVEL_OUTOF10: 0
PAINLEVEL_OUTOF10: 5
PAINLEVEL_OUTOF10: 7
PAINLEVEL_OUTOF10: 5

## 2021-02-25 ASSESSMENT — PAIN DESCRIPTION - FREQUENCY
FREQUENCY: CONTINUOUS
FREQUENCY: INTERMITTENT
FREQUENCY: CONTINUOUS
FREQUENCY: INTERMITTENT
FREQUENCY: CONTINUOUS

## 2021-02-25 ASSESSMENT — PAIN DESCRIPTION - DESCRIPTORS
DESCRIPTORS: ACHING;BURNING
DESCRIPTORS: DISCOMFORT;ACHING
DESCRIPTORS: ACHING;BURNING

## 2021-02-25 ASSESSMENT — PAIN DESCRIPTION - ONSET
ONSET: GRADUAL
ONSET: ON-GOING

## 2021-02-25 ASSESSMENT — PAIN DESCRIPTION - PROGRESSION
CLINICAL_PROGRESSION: NOT CHANGED
CLINICAL_PROGRESSION: GRADUALLY IMPROVING
CLINICAL_PROGRESSION: GRADUALLY IMPROVING
CLINICAL_PROGRESSION: GRADUALLY WORSENING
CLINICAL_PROGRESSION: GRADUALLY IMPROVING
CLINICAL_PROGRESSION: NOT CHANGED
CLINICAL_PROGRESSION: GRADUALLY IMPROVING

## 2021-02-25 ASSESSMENT — PAIN DESCRIPTION - ORIENTATION
ORIENTATION: MID
ORIENTATION: MID

## 2021-02-25 ASSESSMENT — PAIN - FUNCTIONAL ASSESSMENT
PAIN_FUNCTIONAL_ASSESSMENT: ACTIVITIES ARE NOT PREVENTED

## 2021-02-25 NOTE — PROGRESS NOTES
RESPIRATORY THERAPY ASSESSMENT    Name:  Scott Dang II  Medical Record Number:  [id number]  Age: 53 y.o. Gender: male  : 1970  Today's Date:  2021  Room:  [facility]5303-    Assessment     Is the patient being admitted for a COPD or Asthma exacerbation? No   (If yes the patient will be seen every 4 hours for the first 24 hours and then reassessed)    Patient Admission Diagnosis      Allergies  No Known Allergies             Pulmonary History:Asthma  Home Oxygen Therapy:  room air  Home Respiratory Therapy:Albuterol   Current Respiratory Therapy:  Albuterol prn          Respiratory Severity Index(RSI)   Patients with orders for inhalation medications, oxygen, or any therapeutic treatment modality will be placed on Respiratory Protocol. They will be assessed with the first treatment and at least every 72 hours thereafter. The following severity scale will be used to determine frequency of treatment intervention.     Smoking History: No Smoking History = 0    Social History  Social History     Tobacco Use    Smoking status: Never Smoker    Smokeless tobacco: Never Used   Substance Use Topics    Alcohol use: Never     Frequency: Never    Drug use: Never       Recent Surgical History: Lower Abdominal = 2  Past Surgical History  Past Surgical History:   Procedure Laterality Date    BONY PELVIS SURGERY      COLONOSCOPY      polyp removal    COLONOSCOPY N/A 2021    COLONOSCOPY POLYPECTOMY SNARE/COLD BIOPSY performed by Say Osorio MD at [address] [facility] COLONOSCOPY N/A 2021    COLONOSCOPY CONTROL HEMORRHAGE performed by Say Osorio MD at [address] N/A 2021    EXAM UNDER ANESTHESIA, HEMORRHOIDECTOMY X2 performed by Melissa Mcdowell MD at [facility] GASTROINTESTINAL ENDOSCOPY N/A 2021    EGD W/ANES. (13:30) + COVID  performed by Concha Noguera Rory Carrizales MD at [facility]       Level of Consciousness: Alert, Oriented, and Cooperative = 0    Level of Activity: Walking with assistance = 1    Respiratory Pattern: Regular Pattern; RR 8-20 = 0    Breath Sounds: Clear = 0    Sputum   ,  ,    Cough: Strong, spontaneous, non-productive = 0    Vital Signs   BP (!) 152/89   Pulse 85   Temp 99.9 °F (37.7 °C) (Oral)   Resp 16   Ht 6' 2\" (1.88 m)   Wt 253 lb (114.8 kg)   SpO2 100%   BMI 32.48 kg/m²   SPO2 (COPD values may differ): Greater than or equal to 92% on room air = 0    Peak Flow (asthma only): not applicable = 0    RSI: 0-4 = See once and convert to home regimen or discontinue        Plan       Goals: medication delivery, mobilize retained secretions, volume expansion and improve oxygenation    Patient/caregiver was educated on the proper method of use for Respiratory Care Devices:  Yes      Level of patient/caregiver understanding able to:   ? Verbalize understanding   ? Demonstrate understanding       ? Teach back        ? Needs reinforcement       ? No available caregiver               ? Other:     Response to education:  Good     Is patient being placed on Home Treatment Regimen? Yes     Does the patient have everything they need prior to discharge? NA     Comments: pt with hx of asthma    Plan of Care: continue hhn with albuterol prn    Electronically signed by Naomi Mcgill RCP on 2/25/2021 at 6:39 AM    Respiratory Protocol Guidelines     1. Assessment and treatment by Respiratory Therapy will be initiated for medication and therapeutic interventions upon initiation of aerosolized medication. 2. Physician will be contacted for respiratory rate (RR) greater than 35 breaths per minute. Therapy will be held for heart rate (HR) greater than 140 beats per minute, pending direction from physician. 3. Bronchodilators will be administered via Metered Dose Inhaler (MDI) with spacer when the following criteria are met:  a.  Alert and cooperative     b. HR < 140 bpm  c. RR < 30 bpm                d. Can demonstrate a 2-3 second inspiratory hold  4. Bronchodilators will be administered via Hand Held Nebulizer [facility]) to patients when ANY of the following criteria are met  a. Incognizant or uncooperative          b. Patients treated with HHN at Home        c. Unable to demonstrate proper use of MDI with spacer     d. RR > 30 bpm   5. Bronchodilators will be delivered via Metered Dose Inhaler (MDI), HHN, Aerogen to intubated patients on mechanical ventilation. 6. Inhalation medication orders will be delivered and/or substituted as outlined below. Aerosolized Medications Ordering and Administration Guidelines:    1. All Medications will be ordered by a physician, and their frequency and/or modality will be adjusted as defined by the patients Respiratory Severity Index (RSI) score. 2. If the patient does not have documented COPD, consider discontinuing anticholinergics when RSI is less than 9.  3. If the bronchospasm worsens (increased RSI), then the bronchodilator frequency can be increased to a maximum of every 4 hours. If greater than every 4 hours is required, the physician will be contacted. 4. If the bronchospasm improves, the frequency of the bronchodilator can be decreased, based on the patient's RSI, but not less than home treatment regimen frequency. 5. Bronchodilator(s) will be discontinued if patient has a RSI less than 9 and has received no scheduled or as needed treatment for 72  Hrs. Patients Ordered on a Mucolytic Agent:    1. Must always be administered with a bronchodilator. 2. Discontinue if patient experiences worsened bronchospasm, or secretions have lessened to the point that the patient is able to clear them with a cough. Anti-inflammatory and Combination Medications:    1.  If the patient lacks prior history of lung disease, is not using inhaled anti-inflammatory medication at home, and lacks wheezing by examination or by history for at least 24 hours, contact physician for possible discontinuation.

## 2021-02-25 NOTE — PROGRESS NOTES
Pt straight cathed for 550 ml. Pt encouraged to attempt void in 1-2 hours after ambulating again.   Electronically signed by Edward Mayorga RN on 2/25/21 at 6:27 AM EST

## 2021-02-25 NOTE — PROGRESS NOTES
Pt only able to void 100 ml urine overnight. Bladder scan shows 547 ml. Surgical resident made aware. Awaiting orders.   Electronically signed by Gio Calixto RN on 2/25/21 at 5:47 AM EST

## 2021-02-25 NOTE — PLAN OF CARE
Problem: Bleeding:  Goal: Will show no signs and symptoms of excessive bleeding  Description: Will show no signs and symptoms of excessive bleeding  Outcome: Ongoing  Pt has not had any s/s bleeding overnight. Gauze, abd pad and mesh underwear in place. Scant amt light pink drainage noted. VSS. No dizziness. Problem: Falls - Risk of:  Goal: Will remain free from falls  Description: Will remain free from falls  Outcome: Ongoing   Pt has remained in fall precautions overnight following anesthesia and r/t GI bleed. Pt has been using call light appropriately and has good safety awareness. Pt has walked 4 laps in the hallways. Gait is steady. Problem: Pain:  Goal: Control of acute pain  Description: Control of acute pain  Outcome: Ongoing   Pt has c/o pain 7/10 in rectal area. Medicated with prn tylenol and dilaudid with good relief. Waffle cushion offered to alleviate pressure, but pt declines at this time.

## 2021-02-25 NOTE — PROGRESS NOTES
[address] Progress Note    2021     Merna Haleyw II    MRN: [id number]    : 1970    Referring MD: Kezia Wilkinson MD  [address]  [place],  [facility]      SUBJECTIVE: No bleeding overnight    ECOG PS:  (1) Restricted in physically strenuous activity, ambulatory and able to do work of light nature      Medications    Scheduled Meds:   docusate sodium  100 mg Oral BID    polyethylene glycol  17 g Oral Daily    acetaminophen  1,000 mg Oral Q6H    pantoprazole  40 mg Oral QAM AC    albuterol  2 mg Oral TID    magnesium sulfate  2,000 mg Intravenous Once    potassium chloride  40 mEq Oral Once    tamsulosin  0.4 mg Oral Daily     Continuous Infusions:  PRN Meds:.oxyCODONE **OR** oxyCODONE, diazePAM, albuterol, sodium chloride flush    ROS:  As noted above, otherwise remainder of 10-point ROS negative    Physical Exam:     I&O:      Intake/Output Summary (Last 24 hours) at 2021 0921  Last data filed at 2021 5584  Gross per 24 hour   Intake 3947.33 ml   Output 5 ml   Net 1922.33 ml       Vital Signs:  /68   Pulse 94   Temp 98.5 °F (36.9 °C) (Oral)   Resp 16   Ht 6' 2\" (1.88 m)   Wt 253 lb (114.8 kg)   SpO2 100%   BMI 32.48 kg/m²     Weight:    Wt Readings from Last 3 Encounters:   21 253 lb (114.8 kg)   21 253 lb (114.8 kg)         General: Awake, alert and oriented.   HEENT: normocephalic, PERRL, no scleral erythema or icterus, Oral mucosa moist and intact, throat clear  NECK: supple without palpable adenopathy  BACK: Straight negative CVAT  SKIN: warm dry and intact without lesions rashes or masses  CHEST: CTA bilaterally without use of accessory muscles  CV: Normal S1 S2, RRR, no MRG  ABD: NT ND normoactive BS, no palpable masses or hepatosplenomegaly  EXTREMITIES: without edema, denies calf tenderness  NEURO: CN II - XII grossly intact  CATHETER: Peripheral IV    Data    CBC:   Recent Labs     21  0508 02/24/21  1629 02/25/21  0519   WBC 4.0 5.9 12.3*   HGB 7.7* 8.6* 8.3*   HCT 22.5* 25.9* 25.6*   MCV 83.8 84.2 84.3    235 280     BMP/Mag:  Recent Labs     02/24/21  0508 02/25/21  0519    137   K 3.5 3.6    102   CO2 25 22   PHOS 3.1 2.9   BUN 6* 8   CREATININE 0.9 1.3   MG 1.80 1.80     LIVP: No results for input(s): AST, ALT, LIPASE, BILIDIR, BILITOT, ALKPHOS in the last 72 hours. Invalid input(s): AMYLASE,  ALB  Coags:   Recent Labs     02/23/21  1929 02/24/21  1629   PROTIME 12.6  --    INR 1.09  --    APTT  --  30.3     Uric Acid No results for input(s): LABURIC in the last 72 hours. PROBLEM LIST:                   TREATMENT:                 ASSESSMENT AND PLAN:             1. Coagulopathy - The hemoglobin is stable. There is no active bleeding. PT PTT and platelet count are all normal.  He has an extensive history of trauma without significant bleeding. There is no personal or family history of bleeding. Further work-up only have he has additional bleeding.     Lauryn Cooper MD

## 2021-02-25 NOTE — PROGRESS NOTES
VSS. Pt was able to void 425 ml on his own. Flomax started as ordered. Pt ambulated 4 laps in the james, tolerating well. Gauze, abd pad and mesh underwear remain in place-cdi. Pt ate almost 100% of full liquid tray. No nausea. Tylenol given for some discomfort, declining dilaudid at this point. Pt's hair washed with shower cap and bath wipes given to pt. No other needs at this time. Will continue to monitor.   Electronically signed by Connie Singh RN on 2/24/21 at 10:16 PM EST

## 2021-02-25 NOTE — PROGRESS NOTES
Surgery Daily Progress Note      CC: GI bleed    SUBJECTIVE:  Patient had urinary retention overnight requiring two straight caths  Patient denies any further bloody bowel movements  States he feels well this AM    ROS:   A 14 point review of systems was conducted, significant findings as noted above. All other systems negative. OBJECTIVE:    PHYSICAL EXAM:  Vitals:    02/24/21 1444 02/24/21 1953 02/24/21 2256 02/25/21 0213   BP: (!) 144/78 (!) 155/84 (!) 152/85 (!) 152/89   Pulse: 83 88 84 85   Resp: 17 16 16 16   Temp: 98.7 °F (37.1 °C) 100.3 °F (37.9 °C) 99.1 °F (37.3 °C) 99.9 °F (37.7 °C)   TempSrc: Oral Oral Oral Oral   SpO2: 100% 99% 96% 100%   Weight:       Height:         General appearance: alert, no acute distress, grooming appropriate  Eyes: PERRLA, no scleral icterus  Neck: trachea midline, no JVD, no lymphadenopathy  Chest/Lungs: CTAB, no crackles/rales, wheezes/rhonchi, normal effort  Cardiovascular: RRR, no murmurs/gallops/rubs  Abdomen: soft, non-tender, non-distended, +BS, no guarding/rigidity  Skin: warm and dry, no rashes  Extremities: no edema, no cyanosis  Rectal: External exam revealed no thrombosed hemorrhoids or obvious signs of bleeding. No masses or hemorrhoidal tissue was felt. No fluctuance or signs of perirectal abscess present. Neuro: A&Ox3, no focal deficits, sensation intact        ASSESSMENT & PLAN:   This is a 53 y.o. male male with Hx of hypertension and asthma presented to outlying facility for fatigue and rectal bleeding. Patient was previously admitted at [facility] for GI bleeding requiring 3 units of PRBCs. Patient required colonoscopy and tagged red blood cell scan which showed no signs on bleeding, s/p two column hemorrhoidectomy (2/24).      - Patient went to OR yesterday for hemorrhoidectomy  - Hgb stable today 8.3 from 8.6, transfuse for less than 7  - BRIEN this AM, creatinine 1.3 from 0.9 so will give NS bolus  - Continue full liquid diet, currently tolerating  - Sitz baths as needed and after bowel movements  - Valium for muscle sphincter spasms  - Transition to oral pain medications today  - Urinary retention over night requiring two straight caths, will place plummer if unable to urinate in 8 hours     Justin Collazo DO  02/25/21  6:47 AM  [phone]    I performed a history and physical examination of the patient and discussed management with the resident. I reviewed the resident's note and agree with the documented findings and plan of care.  Discussed operative findings: large hemorrhoid columns removed     Mukesh Denton M.D.  2/25/21   11:04 AM

## 2021-02-26 VITALS
DIASTOLIC BLOOD PRESSURE: 93 MMHG | WEIGHT: 253 LBS | HEIGHT: 74 IN | HEART RATE: 84 BPM | OXYGEN SATURATION: 94 % | BODY MASS INDEX: 32.47 KG/M2 | SYSTOLIC BLOOD PRESSURE: 194 MMHG | RESPIRATION RATE: 16 BRPM | TEMPERATURE: 98.1 F

## 2021-02-26 LAB
ALBUMIN SERPL-MCNC: 3.6 G/DL (ref 3.4–5)
ANION GAP SERPL CALCULATED.3IONS-SCNC: 8 MMOL/L (ref 3–16)
BASOPHILS ABSOLUTE: 0 K/UL (ref 0–0.2)
BASOPHILS RELATIVE PERCENT: 0.2 %
BUN BLDV-MCNC: 7 MG/DL (ref 7–20)
CALCIUM SERPL-MCNC: 8.3 MG/DL (ref 8.3–10.6)
CHLORIDE BLD-SCNC: 106 MMOL/L (ref 99–110)
CO2: 26 MMOL/L (ref 21–32)
CREAT SERPL-MCNC: 1 MG/DL (ref 0.9–1.3)
EOSINOPHILS ABSOLUTE: 0.1 K/UL (ref 0–0.6)
EOSINOPHILS RELATIVE PERCENT: 0.9 %
FERRITIN: 23.6 NG/ML (ref 30–400)
GFR AFRICAN AMERICAN: >60
GFR NON-AFRICAN AMERICAN: >60
GLUCOSE BLD-MCNC: 104 MG/DL (ref 70–99)
HCT VFR BLD CALC: 22.5 % (ref 40.5–52.5)
HEMOGLOBIN: 7.5 G/DL (ref 13.5–17.5)
LYMPHOCYTES ABSOLUTE: 1.1 K/UL (ref 1–5.1)
LYMPHOCYTES RELATIVE PERCENT: 14.2 %
MAGNESIUM: 2 MG/DL (ref 1.8–2.4)
MCH RBC QN AUTO: 28.1 PG (ref 26–34)
MCHC RBC AUTO-ENTMCNC: 33.4 G/DL (ref 31–36)
MCV RBC AUTO: 84 FL (ref 80–100)
MONOCYTES ABSOLUTE: 0.5 K/UL (ref 0–1.3)
MONOCYTES RELATIVE PERCENT: 7 %
NEUTROPHILS ABSOLUTE: 5.8 K/UL (ref 1.7–7.7)
NEUTROPHILS RELATIVE PERCENT: 77.7 %
PDW BLD-RTO: 16.3 % (ref 12.4–15.4)
PHOSPHORUS: 3 MG/DL (ref 2.5–4.9)
PLATELET # BLD: 197 K/UL (ref 135–450)
PMV BLD AUTO: 7.1 FL (ref 5–10.5)
POTASSIUM SERPL-SCNC: 3.8 MMOL/L (ref 3.5–5.1)
RBC # BLD: 2.68 M/UL (ref 4.2–5.9)
SODIUM BLD-SCNC: 140 MMOL/L (ref 136–145)
WBC # BLD: 7.5 K/UL (ref 4–11)

## 2021-02-26 PROCEDURE — 6370000000 HC RX 637 (ALT 250 FOR IP): Performed by: STUDENT IN AN ORGANIZED HEALTH CARE EDUCATION/TRAINING PROGRAM

## 2021-02-26 PROCEDURE — 85025 COMPLETE CBC W/AUTO DIFF WBC: CPT

## 2021-02-26 PROCEDURE — 83735 ASSAY OF MAGNESIUM: CPT

## 2021-02-26 PROCEDURE — 36415 COLL VENOUS BLD VENIPUNCTURE: CPT

## 2021-02-26 PROCEDURE — 80069 RENAL FUNCTION PANEL: CPT

## 2021-02-26 PROCEDURE — 82728 ASSAY OF FERRITIN: CPT

## 2021-02-26 PROCEDURE — 83550 IRON BINDING TEST: CPT

## 2021-02-26 PROCEDURE — 2580000003 HC RX 258: Performed by: STUDENT IN AN ORGANIZED HEALTH CARE EDUCATION/TRAINING PROGRAM

## 2021-02-26 PROCEDURE — 83540 ASSAY OF IRON: CPT

## 2021-02-26 RX ORDER — POTASSIUM CHLORIDE 20 MEQ/1
20 TABLET, EXTENDED RELEASE ORAL ONCE
Status: COMPLETED | OUTPATIENT
Start: 2021-02-26 | End: 2021-02-26

## 2021-02-26 RX ORDER — OXYCODONE HYDROCHLORIDE AND ACETAMINOPHEN 5; 325 MG/1; MG/1
1 TABLET ORAL EVERY 6 HOURS PRN
Qty: 28 TABLET | Refills: 0 | Status: SHIPPED | OUTPATIENT
Start: 2021-02-26 | End: 2021-03-05

## 2021-02-26 RX ORDER — DIAZEPAM 5 MG/1
5 TABLET ORAL EVERY 8 HOURS PRN
Qty: 30 TABLET | Refills: 0 | Status: SHIPPED | OUTPATIENT
Start: 2021-02-26 | End: 2021-03-08

## 2021-02-26 RX ORDER — DOCUSATE SODIUM 100 MG/1
100 CAPSULE, LIQUID FILLED ORAL 2 TIMES DAILY PRN
Qty: 60 CAPSULE | Refills: 0 | Status: SHIPPED | OUTPATIENT
Start: 2021-02-26 | End: 2021-03-28

## 2021-02-26 RX ADMIN — OXYCODONE 5 MG: 5 TABLET ORAL at 02:19

## 2021-02-26 RX ADMIN — OXYCODONE 5 MG: 5 TABLET ORAL at 12:20

## 2021-02-26 RX ADMIN — PANTOPRAZOLE SODIUM 40 MG: 40 TABLET, DELAYED RELEASE ORAL at 07:05

## 2021-02-26 RX ADMIN — POTASSIUM CHLORIDE 20 MEQ: 1500 TABLET, EXTENDED RELEASE ORAL at 08:36

## 2021-02-26 RX ADMIN — DOCUSATE SODIUM 100 MG: 100 CAPSULE, LIQUID FILLED ORAL at 08:36

## 2021-02-26 RX ADMIN — Medication 10 ML: at 08:37

## 2021-02-26 RX ADMIN — ACETAMINOPHEN 1000 MG: 500 TABLET ORAL at 05:26

## 2021-02-26 RX ADMIN — OXYCODONE 5 MG: 5 TABLET ORAL at 07:05

## 2021-02-26 RX ADMIN — POLYETHYLENE GLYCOL 3350 17 G: 17 POWDER, FOR SOLUTION ORAL at 08:36

## 2021-02-26 RX ADMIN — TAMSULOSIN HYDROCHLORIDE 0.4 MG: 0.4 CAPSULE ORAL at 08:36

## 2021-02-26 RX ADMIN — ACETAMINOPHEN 1000 MG: 500 TABLET ORAL at 12:19

## 2021-02-26 ASSESSMENT — PAIN DESCRIPTION - DESCRIPTORS
DESCRIPTORS: ACHING
DESCRIPTORS: ACHING

## 2021-02-26 ASSESSMENT — PAIN DESCRIPTION - LOCATION: LOCATION: RECTUM

## 2021-02-26 ASSESSMENT — PAIN SCALES - GENERAL
PAINLEVEL_OUTOF10: 4
PAINLEVEL_OUTOF10: 0

## 2021-02-26 ASSESSMENT — PAIN DESCRIPTION - PAIN TYPE: TYPE: SURGICAL PAIN

## 2021-02-26 ASSESSMENT — PAIN DESCRIPTION - ONSET
ONSET: ON-GOING

## 2021-02-26 ASSESSMENT — PAIN DESCRIPTION - PROGRESSION
CLINICAL_PROGRESSION: NOT CHANGED
CLINICAL_PROGRESSION: NOT CHANGED

## 2021-02-26 NOTE — PROGRESS NOTES
VSS. Pt c/o rectal pain. Medicated with prn oxycodone and scheduled tylenol with good relief. Pt expressed concern about being able to sleep and pain. Pt made aware of prn valium that was ordered earlier for rectal spasms. Pt informed this might help him sleep as well. Pt took dose and has been sleeping for intervals. Pt continues to void without issues. Surgical underwear on with gauze in place cdi. Pt denies needs. Call light in reach. Will continue to monitor.   Electronically signed by Radha Glover RN on 2/26/21 at 1:31 AM EST

## 2021-02-26 NOTE — PROGRESS NOTES
Pt alert and oriented. VSS. Pt has been afebrile. Dressing clean, dry and intact. Pt tolerating diet. Bowel sounds active, +flatus, small, soft bowel movement noted. Pt ambulating in halls and room independently, steady gait. Pain has been controlled with scheduled tylenol and prn oxycodone. Pt has been satisfied with pain control. Pt verbalizes readiness for discharge. Call light in reach. Will continue to monitor.  Electronically signed by Radha Madera RN on 2/26/2021 at 10:25 AM

## 2021-02-26 NOTE — PROGRESS NOTES
Pt alert and oriented. VSS. Pt febrile, prn tylenol administered. Tmax- 101.3. + flatus, active bowel sounds, tolerating diet Pt voiding without difficulty. Abd pad and fluff gauze changed, small amount of sanguinous drainage noted. Pt ambulating in james and room without difficulty. Pain has been managed with prn oxycodone and tylenol. Pt satisfied with pain control. Pt calls out appropriately. Call light in reach. Will continue to monitor.  Electronically signed by Alicia Geiger RN on 2/25/2021 at 8:03 PM

## 2021-02-26 NOTE — CARE COORDINATION
Case Management Assessment            Discharge Note                    Date / Time of Note: 2/26/2021 10:13 AM                  Discharge Note Completed by: Aleta Aburto    Patient Name: Gely Navarrete II   YOB: 1970  Diagnosis: GI bleed [K92.2]   Date / Time: 2/23/2021  3:24 PM    Current PCP: No primary care provider on file. Clinic patient: No    Hospitalization in the last 30 days: No    Advance Directives:  Code Status: Full Code  [place] DNR form completed and on chart: No    Financial:  Payor: Natalio Griffin 150 / Plan: BCBS - [place] PPO / Product Type: *No Product type* /      Pharmacy:  No Pharmacies [address] [place] medications?: Potential Assistance Purchasing Medications: No  Assistance provided by Case Management: None at this time    Does patient want to participate in local refill/ meds to beds program?: Yes    Meds To [employer] Rules:  1. Can ONLY be done Monday- Friday between 8:30am-5pm  2. Prescription(s) must be in pharmacy by 3pm to be filled same day  3. Copy of patient's insurance/ prescription drug card and patient face sheet must be sent along with the prescription(s)  4. Cost of Rx cannot be added to hospital bill. If financial assistance is needed, please contact unit  or ;  or  CANNOT provide pharmacy voucher for patients co-pays  5.  Patients can then  the prescription on their way out of the hospital at discharge, or pharmacy can deliver to the bedside if staff is available. (payment due at time of pick-up or delivery - cash, check, or card accepted)     Able to afford home medications/ co-pay costs: Yes    ADLS:  Current PT AM-PAC Score:   /24  Current OT AM-PAC Score:   /24      DISCHARGE Disposition: Home- No Services Needed    LOC at discharge: Not Applicable  ARELI Completed: Not Indicated    Notification completed in HENS/PAS?:  Not Applicable    IMM Completed:   Not Indicated    Transportation:  Transportation PLAN for discharge: family   Mode of Transport: [address] ordered at discharge: Not [address]: Not Applicable  Orders faxed: No    Durable Medical Equipment:  DME Provider: none  Equipment obtained during hospitalization:     Home Oxygen and Respiratory Equipment:  Oxygen needed at discharge?: Not [address]: Not Applicable  Portable tank available for discharge?: Not Indicated    Dialysis:  Dialysis patient: No    Dialysis Center:  Not Applicable      Additional CM Notes: Pt from home with family support, will return home no needs at DC    The Plan for Transition of Care is related to the following treatment goals of GI bleed [K92.2]    The Patient and/or patient representative Evelia Watkins and his family were provided with a choice of provider and agrees with the discharge plan Yes    Freedom of choice list was provided with basic dialogue that supports the patient's individualized plan of care/goals and shares the quality data associated with the providers.  Not Indicated    Care Transitions patient: No    Elgin Logan RN  [facility] [employer]  Case Management Department  Ph: [phone]  Fax: [phone]

## 2021-02-26 NOTE — PROGRESS NOTES
[facility] Progress Note    2021     Rita Fuentes    MRN: [id number]    : 1970    Referring MD: Isac Rosa MD  [address] , [facility]  [place],  [facility]      SUBJECTIVE: No bleeding, anticipating discharge. ECOG PS:  (1) Restricted in physically strenuous activity, ambulatory and able to do work of light nature      Medications    Scheduled Meds:   docusate sodium  100 mg Oral BID    polyethylene glycol  17 g Oral Daily    acetaminophen  1,000 mg Oral Q6H    pantoprazole  40 mg Oral QAM AC    albuterol  2 mg Oral TID    tamsulosin  0.4 mg Oral Daily     Continuous Infusions:  PRN Meds:.oxyCODONE **OR** oxyCODONE, diazePAM, albuterol, sodium chloride flush    ROS:  As noted above, otherwise remainder of 10-point ROS negative    Physical Exam:     I&O:      Intake/Output Summary (Last 24 hours) at 2021 1034  Last data filed at 2021 1018  Gross per 24 hour   Intake 1810 ml   Output 3955 ml   Net -2145 ml       Vital Signs:  BP (!) 143/66   Pulse 87   Temp 99.3 °F (37.4 °C) (Oral)   Resp 16   Ht 6' 2\" (1.88 m)   Wt 253 lb (114.8 kg)   SpO2 97%   BMI 32.48 kg/m²     Weight:    Wt Readings from Last 3 Encounters:   21 253 lb (114.8 kg)   21 253 lb (114.8 kg)         General: Awake, alert and oriented.   HEENT: normocephalic, PERRL, no scleral erythema or icterus, Oral mucosa moist and intact, throat clear  NECK: supple without palpable adenopathy  BACK: Straight negative CVAT  SKIN: warm dry and intact without lesions rashes or masses  CHEST: CTA bilaterally without use of accessory muscles  CV: Normal S1 S2, RRR, no MRG  ABD: NT ND normoactive BS, no palpable masses or hepatosplenomegaly  EXTREMITIES: without edema, denies calf tenderness  NEURO: CN II - XII grossly intact  CATHETER: Peripheral IV    Data    CBC:   Recent Labs     21  1629 21  0519 21  0542   WBC 5.9 12.3* 7.5   HGB 8.6* 8.3* 7.5*   HCT 25.9* 25.6* 22.5*   MCV 84.2 84.3 84.0    280 197     BMP/Mag:  Recent Labs     02/24/21  0508 02/25/21  0519 02/26/21  0542    137 140   K 3.5 3.6 3.8    102 106   CO2 25 22 26   PHOS 3.1 2.9 3.0   BUN 6* 8 7   CREATININE 0.9 1.3 1.0   MG 1.80 1.80 2.00     LIVP: No results for input(s): AST, ALT, LIPASE, BILIDIR, BILITOT, ALKPHOS in the last 72 hours. Invalid input(s): AMYLASE,  ALB  Coags:   Recent Labs     02/23/21  1929 02/24/21  1629   PROTIME 12.6  --    INR 1.09  --    APTT  --  30.3     Uric Acid No results for input(s): LABURIC in the last 72 hours. ASSESSMENT AND PLAN:             1. Coagulopathy - The hemoglobin has drifted down to 7.5. Tests for vWD and Fe def sent prior to D/C.     Beto Perez MD

## 2021-02-26 NOTE — PROGRESS NOTES
Surgery Daily Progress Note      CC: GI bleed    SUBJECTIVE:  Urinary retention resolved and patient voiding on own  Pain controlled with oral pain medications  Tolerating full liquid diet  Patient denies any further bloody bowel movements    ROS:   A 14 point review of systems was conducted, significant findings as noted above. All other systems negative. OBJECTIVE:    PHYSICAL EXAM:  Vitals:    02/25/21 1908 02/25/21 1958 02/25/21 2309 02/26/21 0216   BP:  (!) 150/68 139/65 (!) 143/66   Pulse:  100 92 87   Resp:  16 16 16   Temp: 100.6 °F (38.1 °C) 98.6 °F (37 °C) 98.9 °F (37.2 °C) 99.3 °F (37.4 °C)   TempSrc: Oral Oral Oral Oral   SpO2:  97% 97% 97%   Weight:       Height:         General appearance: alert, no acute distress, grooming appropriate  Eyes: PERRLA, no scleral icterus  Neck: trachea midline, no JVD, no lymphadenopathy  Chest/Lungs: CTAB, no crackles/rales, wheezes/rhonchi, normal effort  Cardiovascular: RRR, no murmurs/gallops/rubs  Abdomen: soft, non-tender, non-distended, +BS, no guarding/rigidity  Skin: warm and dry, no rashes  Extremities: no edema, no cyanosis  Rectal: External exam revealed no thrombosed hemorrhoids or obvious signs of bleeding. No masses or hemorrhoidal tissue was felt. No fluctuance or signs of perirectal abscess present. Neuro: A&Ox3, no focal deficits, sensation intact        ASSESSMENT & PLAN:   This is a 53 y.o. male male with Hx of hypertension and asthma presented to outlying facility for fatigue and rectal bleeding. Patient was previously admitted at [facility] for GI bleeding requiring 3 units of PRBCs. Patient required colonoscopy and tagged red blood cell scan which showed no signs on bleeding, s/p two column hemorrhoidectomy (2/24).      - Hgb stable at7.5 from 8.3  - Creatinine at baseline this AM  - Continue full liquid diet, currently tolerating  - Sitz baths as needed and after bowel movements  - Valium for muscle sphincter spasms  - Continue oral pain medications  - Patient will follow up with Dr. Carl Magaña outpatient  - Patient will discharge today    Silva SorensonDO  02/26/21  6:23 AM  [phone]    I performed a history and physical examination of the patient and discussed management with the resident. I reviewed the resident's note and agree with the documented findings and plan of care.     Rin Campos M.D.  2/26/21   10:55 AM

## 2021-02-27 LAB
IRON SATURATION: 3 % (ref 20–50)
IRON: 10 UG/DL (ref 59–158)
TOTAL IRON BINDING CAPACITY: 286 UG/DL (ref 260–445)

## 2021-03-02 ENCOUNTER — TELEPHONE (OUTPATIENT)
Dept: SURGERY | Age: 51
End: 2021-03-02

## 2021-03-02 NOTE — DISCHARGE SUMMARY
Home    Condition at discharge: Stable    Discharge Instructions: See separate form    Patient Instructions:      Medication List      START taking these medications    diazePAM 5 MG tablet  Commonly known as: Valium  Take 1 tablet by mouth every 8 hours as needed (muscle spasms) for up to 10 days. Notes to patient: .  Diazepam  (Valium)  USE--  Ease anxiety  SIDE EFFECTS--  Feeling sleepy, confusion, lightheadedness     docusate sodium 100 MG capsule  Commonly known as: COLACE  Take 1 capsule by mouth 2 times daily as needed for Constipation Take as needed to maintain soft formed bowel movements. Notes to patient: Docusate Sodium  (Colace)  USE-- Stool softener, prevents or treats constipation  SIDE EFFECTS--  Stomach upset     oxyCODONE-acetaminophen 5-325 MG per tablet  Commonly known as: Percocet  Take 1 tablet by mouth every 6 hours as needed for Pain for up to 7 days. Intended supply: 3 days. Take lowest dose possible to manage pain  Notes to patient: Oxycodone and Acetaminophen (Percocet)  USE--  Treat moderate to severe pain (contains opiate and tylenol)  SIDE EFFECTS--  Upset stomach, feeling sleepy, constipation  CAUTION driving or operating heavy machinery        CONTINUE taking these medications    * albuterol 2 MG/5ML syrup  Commonly known as: PROVENTIL;VENTOLIN     * albuterol 1.25 MG/3ML nebulizer solution  Commonly known as: ACCUNEB     Omega 3 1200 MG Caps     therapeutic multivitamin-minerals tablet     vitamin C 250 MG tablet         * This list has 2 medication(s) that are the same as other medications prescribed for you. Read the directions carefully, and ask your doctor or other care provider to review them with you.             STOP taking these medications    hydrocortisone 1 % ointment           Where to Get Your Medications      You can get these medications from any pharmacy    Bring a paper prescription for each of these medications  · diazePAM 5 MG tablet  · docusate sodium 100 MG capsule  · oxyCODONE-acetaminophen 5-325 MG per tablet         Silva Sorenson DO  03/02/21  11:29 AM

## 2021-03-05 ENCOUNTER — TELEPHONE (OUTPATIENT)
Dept: SURGERY | Age: 51
End: 2021-03-05

## 2021-03-05 NOTE — TELEPHONE ENCOUNTER
Patient called and had surgery with Dr Carly Trejo on 2/24/21 and has follow up on Monday 3/8/21 and is going to be out of pain medication. Routing to MD to review.

## 2021-03-06 NOTE — TELEPHONE ENCOUNTER
Spoke with patient - he is off narcotics and doing fine on tylenol             Zander Farmer M.D.  3/6/21   5:34 PM

## 2021-03-08 ENCOUNTER — OFFICE VISIT (OUTPATIENT)
Dept: SURGERY | Age: 51
End: 2021-03-08

## 2021-03-08 VITALS
SYSTOLIC BLOOD PRESSURE: 134 MMHG | DIASTOLIC BLOOD PRESSURE: 86 MMHG | HEIGHT: 74 IN | BODY MASS INDEX: 30.16 KG/M2 | HEART RATE: 84 BPM | OXYGEN SATURATION: 100 % | TEMPERATURE: 97.5 F | WEIGHT: 235 LBS

## 2021-03-08 DIAGNOSIS — K64.9 HEMORRHOIDS, UNSPECIFIED HEMORRHOID TYPE: Primary | ICD-10-CM

## 2021-03-08 PROCEDURE — 99024 POSTOP FOLLOW-UP VISIT: CPT | Performed by: SURGERY

## 2021-03-08 NOTE — PROGRESS NOTES
Subjective:       Vanice Kocher II presents to the clinic 2 weeks after hemorrhoidectomy    HPI: Doing well. No longer needs pain medication. Eating and passing stool without difficulty   Objective:      /86   Pulse 84   Temp 97.5 °F (36.4 °C) (Infrared)   Ht 6' 2\" (1.88 m)   Wt 235 lb (106.6 kg)   SpO2 100%   BMI 30.17 kg/m²     General:  alert, appears stated age and cooperative   Abdomen: soft, bowel sounds active, non-tender   Incision:   healing well, no drainage, no erythema, no dehiscence       Assessment:      Doing well postoperatively. Plan:      1. Continue any current medications. 2. Wound care discussed. 3. Activity restrictions discussed.  See me 1 month    Gumaro Barraza M.D.  3/8/21   12:18 PM

## 2021-03-15 ENCOUNTER — TELEPHONE (OUTPATIENT)
Dept: SURGERY | Age: 51
End: 2021-03-15

## 2021-04-07 ENCOUNTER — OFFICE VISIT (OUTPATIENT)
Dept: SURGERY | Age: 51
End: 2021-04-07

## 2021-04-07 VITALS
HEIGHT: 74 IN | TEMPERATURE: 98 F | SYSTOLIC BLOOD PRESSURE: 147 MMHG | BODY MASS INDEX: 31.57 KG/M2 | HEART RATE: 84 BPM | DIASTOLIC BLOOD PRESSURE: 91 MMHG | OXYGEN SATURATION: 99 % | WEIGHT: 246 LBS

## 2021-04-07 DIAGNOSIS — K64.9 HEMORRHOIDS, UNSPECIFIED HEMORRHOID TYPE: Primary | ICD-10-CM

## 2021-04-07 PROCEDURE — 99024 POSTOP FOLLOW-UP VISIT: CPT | Performed by: SURGERY

## 2021-06-30 ENCOUNTER — OFFICE VISIT (OUTPATIENT)
Dept: SURGERY | Age: 51
End: 2021-06-30

## 2021-06-30 VITALS
WEIGHT: 246 LBS | SYSTOLIC BLOOD PRESSURE: 155 MMHG | TEMPERATURE: 97.8 F | BODY MASS INDEX: 31.57 KG/M2 | HEART RATE: 75 BPM | HEIGHT: 74 IN | OXYGEN SATURATION: 98 % | DIASTOLIC BLOOD PRESSURE: 96 MMHG

## 2021-06-30 DIAGNOSIS — K64.9 HEMORRHOIDS, UNSPECIFIED HEMORRHOID TYPE: Primary | ICD-10-CM

## 2021-06-30 PROCEDURE — 99024 POSTOP FOLLOW-UP VISIT: CPT | Performed by: SURGERY

## 2021-06-30 NOTE — PROGRESS NOTES
Subjective:     Patient is a 53 y.o. man with hemorrhoids    HPI: Mr. Nader Lopez reports he has been doing well since hemorrhoid surgery. He has kept stool soft. Rare spot of blood over last few weeks. He has been logging a high number of miles on bike. Patient Active Problem List    Diagnosis Date Noted    GI bleed 02/23/2021    Lower GI bleed     Anemia due to acute blood loss     BRBPR (bright red blood per rectum) 02/21/2021     Past Medical History:   Diagnosis Date    Asthma     COVID-19 01/29/2021    Hypertension       Past Surgical History:   Procedure Laterality Date    BONY PELVIS SURGERY      COLONOSCOPY      polyp removal    COLONOSCOPY N/A 2/22/2021    COLONOSCOPY POLYPECTOMY SNARE/COLD BIOPSY performed by Dale Mckinney MD at [address] [facility] COLONOSCOPY N/A 2/22/2021    COLONOSCOPY CONTROL HEMORRHAGE performed by Dale Mckinney MD at [address] N/A 2/24/2021    EXAM UNDER ANESTHESIA, HEMORRHOIDECTOMY X2 performed by Montie Dance, MD at [place] [address] ENDOSCOPY N/A 2/22/2021    EGD W/ANES. (13:30) + COVID 1/29 performed by Dale Mckinney MD at [address] [facility]      Not in a hospital admission. Allergies   Allergen Reactions    Grass Pollen(K-O-R-T-Swt Jagdish) Other (See Comments)     Soybean dust, corn dust, and wheat dust.   Ragweed  Pollen       Social History     Tobacco Use    Smoking status: Never Smoker    Smokeless tobacco: Never Used   Substance Use Topics    Alcohol use: Never        Review of Systems    GEN: reviewed and negative except as noted in HPI. GI: reviewed and negative except as noted in HPI.      Objective:     GEN: appears well, no distress, appears stated age  PSYCH: normal mood, normal affect  NECK: no neck masses, trachea midline  ENT: moist oral mucosa; anicteric  SKIN: no rash or jaundice  CV: regular heart rate and rhythm  PULM: normal respiratory effort, no wheezing  GI: soft non tender abdomen. Normal bowel sounds  RECTAL: No external disease. No concerning masses on digital. Anoscopy placed with some difficulty due to muscle spasm. Only small hemorrhoids seen in brief view. No blood on scope or glove. Examined with ALINA Garcia  EXT/NEURO: normal gait, strength/sensation grossly intact in all extremities      Assessment:     Hemorrhoids     Plan:     Discussed diet/fiber    See me at end of summer if still having some bleeding    I don't think he will tolerate banding in office based on difficulty with anoscopy.     Armando Ray M.D.  6/30/21   11:23 AM

## 2021-11-01 NOTE — PROGRESS NOTES
11/01/21 1336   OTHER   Discipline occupational therapist   Rehab Time/Intention   Session Not Performed patient/family declined evaluation  (OT eval attempted x2 this date, however pt declining on both attempts. OT to f/u 11/2.)   Recommendation   OT - Next Appointment 11/02/21      PACU Transfer to [facility]      Pt meets criteria to transfer to next phase of care per Belinda Bolton and [facility] standards    No results for input(s): POCGLU in the last 72 hours. Vitals:    02/24/21 1415   BP: 119/74   Pulse: 83   Resp: 14   Temp: 98 °F (36.7 °C)   SpO2: 94%      BP within 20% of pt's admitting BP as per Ramon Score      Intake/Output Summary (Last 24 hours) at 2/24/2021 1421  Last data filed at 2/24/2021 1329  Gross per 24 hour   Intake 4275 ml   Output 100 ml   Net 4175 ml             Patient transferred to care of [facility] RN.   Family updated and directed to [facility]    2/24/2021 2:21 PM

## 2021-11-12 ENCOUNTER — OFFICE VISIT (OUTPATIENT)
Dept: SURGERY | Age: 51
End: 2021-11-12
Payer: COMMERCIAL

## 2021-11-12 VITALS
HEIGHT: 74 IN | SYSTOLIC BLOOD PRESSURE: 144 MMHG | TEMPERATURE: 97.8 F | WEIGHT: 256.2 LBS | BODY MASS INDEX: 32.88 KG/M2 | HEART RATE: 98 BPM | OXYGEN SATURATION: 98 % | DIASTOLIC BLOOD PRESSURE: 89 MMHG

## 2021-11-12 DIAGNOSIS — K64.9 HEMORRHOIDS, UNSPECIFIED HEMORRHOID TYPE: Primary | ICD-10-CM

## 2021-11-12 PROCEDURE — 99212 OFFICE O/P EST SF 10 MIN: CPT | Performed by: SURGERY

## 2021-11-12 RX ORDER — PROPRANOLOL HYDROCHLORIDE 80 MG/1
80 TABLET ORAL
COMMUNITY

## 2021-11-12 NOTE — PROGRESS NOTES
Subjective:     Patient is a 49 y.o. man with hemorrhoidectomy earlier this year     HPI: Doing well since last seen. Energy level has been improved. Has had a few spots of blood with BM. Still enjoying bike riding. He reports Hgb level went up after starting iron     Patient Active Problem List    Diagnosis Date Noted    GI bleed 02/23/2021    Lower GI bleed     Anemia due to acute blood loss     BRBPR (bright red blood per rectum) 02/21/2021     Past Medical History:   Diagnosis Date    Asthma     COVID-19 01/29/2021    Hypertension       Past Surgical History:   Procedure Laterality Date    BONY PELVIS SURGERY      COLONOSCOPY      polyp removal    COLONOSCOPY N/A 2/22/2021    COLONOSCOPY POLYPECTOMY SNARE/COLD BIOPSY performed by Tim Saint, MD at [address] N/A 2/22/2021    COLONOSCOPY CONTROL HEMORRHAGE performed by Tim Saint, MD at [address] N/A 2/24/2021    EXAM UNDER ANESTHESIA, HEMORRHOIDECTOMY X2 performed by Esdras Grijalva MD at [place] [address] ENDOSCOPY N/A 2/22/2021    EGD W/ANES. (13:30) + COVID 1/29 performed by Tim Saint, MD at [address] [facility]      Not in a hospital admission. Allergies   Allergen Reactions    Grass Pollen(K-O-R-T-Swt Jagdish) Other (See Comments)     Soybean dust, corn dust, and wheat dust.   Ragweed  Pollen       Social History     Tobacco Use    Smoking status: Never Smoker    Smokeless tobacco: Never Used   Substance Use Topics    Alcohol use: Never        Review of Systems    GEN: reviewed and negative except as noted in HPI. GI: reviewed and negative except as noted in HPI.      Objective:     GEN: appears well, no distress, appears stated age  PSYCH: normal mood, normal affect  NECK: no neck masses, trachea midline  ENT: moist oral mucosa; anicteric  SKIN: no rash or jaundice  CV: regular heart rate and rhythm  PULM: normal respiratory effort, no wheezing  GI: soft non tender abdomen. Normal bowel sounds  RECTAL: healed surgical scars. On anoscopy there are very small non bleeding hemorrhoids. Examined with Chris Amador RN present   EXT/NEURO: normal gait, strength/sensation grossly intact in all extremities    Assessment:     Post hemorrhoidectomy, small amount of residual hemorrhoidal tissue     Plan:     Discussed continue to monitor bleeding and energy level    I don't think he needs any further surgery at this point.  Scar should continue to soften over next 3-4 months     Jeronimo Smith M.D.  11/12/21   12:15 PM

## 2022-01-20 ENCOUNTER — APPOINTMENT (OUTPATIENT)
Dept: CT IMAGING | Age: 52
End: 2022-01-20
Payer: COMMERCIAL

## 2022-01-20 ENCOUNTER — HOSPITAL ENCOUNTER (EMERGENCY)
Age: 52
Discharge: HOME | End: 2022-01-20
Attending: EMERGENCY MEDICINE
Payer: COMMERCIAL

## 2022-01-20 VITALS
WEIGHT: 253 LBS | OXYGEN SATURATION: 98 % | RESPIRATION RATE: 16 BRPM | HEIGHT: 74 IN | BODY MASS INDEX: 32.47 KG/M2 | SYSTOLIC BLOOD PRESSURE: 140 MMHG | HEART RATE: 83 BPM | DIASTOLIC BLOOD PRESSURE: 82 MMHG | TEMPERATURE: 98.6 F

## 2022-01-20 DIAGNOSIS — N23 RENAL COLIC: ICD-10-CM

## 2022-01-20 DIAGNOSIS — N20.0 KIDNEY STONE: Primary | ICD-10-CM

## 2022-01-20 LAB
A/G RATIO: 1.5 (ref 1.1–2.2)
ALBUMIN SERPL-MCNC: 4.4 G/DL (ref 3.4–5)
ALP BLD-CCNC: 68 U/L (ref 40–129)
ALT SERPL-CCNC: 30 U/L (ref 10–40)
ANION GAP SERPL CALCULATED.3IONS-SCNC: 11 MMOL/L (ref 3–16)
AST SERPL-CCNC: 29 U/L (ref 15–37)
BASOPHILS ABSOLUTE: 0 K/UL (ref 0–0.2)
BASOPHILS RELATIVE PERCENT: 0.5 %
BILIRUB SERPL-MCNC: 0.8 MG/DL (ref 0–1)
BILIRUBIN URINE: NEGATIVE
BLOOD, URINE: NEGATIVE
BUN BLDV-MCNC: 16 MG/DL (ref 7–20)
CALCIUM SERPL-MCNC: 9.8 MG/DL (ref 8.3–10.6)
CHLORIDE BLD-SCNC: 103 MMOL/L (ref 99–110)
CLARITY: CLEAR
CO2: 25 MMOL/L (ref 21–32)
COLOR: YELLOW
CREAT SERPL-MCNC: 1.2 MG/DL (ref 0.9–1.3)
EOSINOPHILS ABSOLUTE: 0.1 K/UL (ref 0–0.6)
EOSINOPHILS RELATIVE PERCENT: 1.3 %
GFR AFRICAN AMERICAN: >60
GFR NON-AFRICAN AMERICAN: >60
GLUCOSE BLD-MCNC: 147 MG/DL (ref 70–99)
GLUCOSE URINE: NEGATIVE MG/DL
HCT VFR BLD CALC: 44.7 % (ref 40.5–52.5)
HEMOGLOBIN: 15.2 G/DL (ref 13.5–17.5)
KETONES, URINE: NEGATIVE MG/DL
LEUKOCYTE ESTERASE, URINE: NEGATIVE
LYMPHOCYTES ABSOLUTE: 1.4 K/UL (ref 1–5.1)
LYMPHOCYTES RELATIVE PERCENT: 19.8 %
MCH RBC QN AUTO: 28.6 PG (ref 26–34)
MCHC RBC AUTO-ENTMCNC: 34 G/DL (ref 31–36)
MCV RBC AUTO: 84.1 FL (ref 80–100)
MICROSCOPIC EXAMINATION: NORMAL
MONOCYTES ABSOLUTE: 0.4 K/UL (ref 0–1.3)
MONOCYTES RELATIVE PERCENT: 6.1 %
NEUTROPHILS ABSOLUTE: 5.3 K/UL (ref 1.7–7.7)
NEUTROPHILS RELATIVE PERCENT: 72.3 %
NITRITE, URINE: NEGATIVE
PDW BLD-RTO: 14.4 % (ref 12.4–15.4)
PH UA: 5.5 (ref 5–8)
PLATELET # BLD: 210 K/UL (ref 135–450)
PMV BLD AUTO: 6.9 FL (ref 5–10.5)
POTASSIUM SERPL-SCNC: 4.3 MMOL/L (ref 3.5–5.1)
PROTEIN UA: NEGATIVE MG/DL
RBC # BLD: 5.32 M/UL (ref 4.2–5.9)
SODIUM BLD-SCNC: 139 MMOL/L (ref 136–145)
SPECIFIC GRAVITY UA: >=1.03 (ref 1–1.03)
TOTAL PROTEIN: 7.4 G/DL (ref 6.4–8.2)
URINE REFLEX TO CULTURE: NORMAL
URINE TYPE: NORMAL
UROBILINOGEN, URINE: 0.2 E.U./DL
WBC # BLD: 7.3 K/UL (ref 4–11)

## 2022-01-20 PROCEDURE — 80053 COMPREHEN METABOLIC PANEL: CPT

## 2022-01-20 PROCEDURE — 96374 THER/PROPH/DIAG INJ IV PUSH: CPT

## 2022-01-20 PROCEDURE — 74176 CT ABD & PELVIS W/O CONTRAST: CPT

## 2022-01-20 PROCEDURE — 81003 URINALYSIS AUTO W/O SCOPE: CPT

## 2022-01-20 PROCEDURE — 85025 COMPLETE CBC W/AUTO DIFF WBC: CPT

## 2022-01-20 PROCEDURE — 99284 EMERGENCY DEPT VISIT MOD MDM: CPT

## 2022-01-20 PROCEDURE — 96361 HYDRATE IV INFUSION ADD-ON: CPT

## 2022-01-20 PROCEDURE — 6360000002 HC RX W HCPCS

## 2022-01-20 PROCEDURE — 2580000003 HC RX 258: Performed by: EMERGENCY MEDICINE

## 2022-01-20 RX ORDER — TAMSULOSIN HYDROCHLORIDE 0.4 MG/1
0.4 CAPSULE ORAL DAILY
Qty: 7 CAPSULE | Refills: 0 | Status: SHIPPED | OUTPATIENT
Start: 2022-01-20

## 2022-01-20 RX ORDER — OXYCODONE HYDROCHLORIDE AND ACETAMINOPHEN 5; 325 MG/1; MG/1
1 TABLET ORAL EVERY 8 HOURS PRN
Qty: 5 TABLET | Refills: 0 | Status: SHIPPED | OUTPATIENT
Start: 2022-01-20 | End: 2022-01-23

## 2022-01-20 RX ORDER — ONDANSETRON 4 MG/1
4 TABLET, ORALLY DISINTEGRATING ORAL EVERY 8 HOURS PRN
Qty: 12 TABLET | Refills: 0 | Status: SHIPPED | OUTPATIENT
Start: 2022-01-20

## 2022-01-20 RX ORDER — 0.9 % SODIUM CHLORIDE 0.9 %
1000 INTRAVENOUS SOLUTION INTRAVENOUS ONCE
Status: COMPLETED | OUTPATIENT
Start: 2022-01-20 | End: 2022-01-20

## 2022-01-20 RX ORDER — KETOROLAC TROMETHAMINE 10 MG/1
10 TABLET, FILM COATED ORAL EVERY 8 HOURS PRN
Qty: 12 TABLET | Refills: 0 | Status: SHIPPED | OUTPATIENT
Start: 2022-01-20 | End: 2023-01-20

## 2022-01-20 RX ORDER — KETOROLAC TROMETHAMINE 30 MG/ML
15 INJECTION, SOLUTION INTRAMUSCULAR; INTRAVENOUS ONCE
Status: COMPLETED | OUTPATIENT
Start: 2022-01-20 | End: 2022-01-20

## 2022-01-20 RX ORDER — KETOROLAC TROMETHAMINE 30 MG/ML
INJECTION, SOLUTION INTRAMUSCULAR; INTRAVENOUS
Status: COMPLETED
Start: 2022-01-20 | End: 2022-01-20

## 2022-01-20 RX ADMIN — KETOROLAC TROMETHAMINE 15 MG: 30 INJECTION, SOLUTION INTRAMUSCULAR at 07:47

## 2022-01-20 RX ADMIN — KETOROLAC TROMETHAMINE 15 MG: 30 INJECTION, SOLUTION INTRAMUSCULAR; INTRAVENOUS at 07:47

## 2022-01-20 RX ADMIN — SODIUM CHLORIDE 1000 ML: 9 INJECTION, SOLUTION INTRAVENOUS at 07:46

## 2022-01-20 ASSESSMENT — PAIN DESCRIPTION - PAIN TYPE
TYPE: ACUTE PAIN
TYPE: ACUTE PAIN

## 2022-01-20 ASSESSMENT — PAIN SCALES - GENERAL
PAINLEVEL_OUTOF10: 2
PAINLEVEL_OUTOF10: 7
PAINLEVEL_OUTOF10: 7

## 2022-01-20 ASSESSMENT — PAIN DESCRIPTION - DESCRIPTORS: DESCRIPTORS: BURNING

## 2022-01-20 ASSESSMENT — PAIN DESCRIPTION - LOCATION: LOCATION: ABDOMEN

## 2022-01-20 NOTE — ED NOTES
0386 - called [facility] for on call      Sugar Morrissin  01/20/22 2764 [facility] NP called back after pt d/c     Sugar Morrissin  01/20/22 0627

## 2022-01-20 NOTE — ED PROVIDER NOTES
[address] ED      CHIEF COMPLAINT  Abdominal Pain (lower left sided pain onset 0400 pt thinks could possibly be groin pain has had emesis x 1)       HISTORY OF PRESENT ILLNESS  Shaneka Lira II is a 49 y.o. male  who presents to the ED complaining of left flank pain. The patient states that started at 4 AM.  States it woke her from sleep, he urinated and had a bowel movement and subsequently tried to go back to sleep. The pain persisted, he attempted to eat a pancake for breakfast however had another episode of emesis. He describes pain in his left flank that radiates around to his left lateral abdomen. He denies any worsening or remitting factors. He has never had this pain before. He denies any scrotal or testicle pain. He denies any actual abdominal pain states it is more in his flank. He denies dysuria or hematuria. Denies fevers, chills chest pain or shortness of breath. No other complaints, modifying factors or associated symptoms. I have reviewed the following from the nursing documentation. Past Medical History:   Diagnosis Date    Asthma     COVID-19 01/29/2021    Hypertension      Past Surgical History:   Procedure Laterality Date    BONY PELVIS SURGERY      COLONOSCOPY      polyp removal    COLONOSCOPY N/A 2/22/2021    COLONOSCOPY POLYPECTOMY SNARE/COLD BIOPSY performed by Dick Sahni MD at [address] [facility] COLONOSCOPY N/A 2/22/2021    COLONOSCOPY CONTROL HEMORRHAGE performed by Dick Sahni MD at [address] N/A 2/24/2021    EXAM UNDER ANESTHESIA, HEMORRHOIDECTOMY X2 performed by Joel Alan MD at [facility] GASTROINTESTINAL ENDOSCOPY N/A 2/22/2021    EGD W/ANES. (13:30) + COVID 1/29 performed by Dick aShni MD at [address] [facility]     History reviewed. No pertinent family history.   Social History Socioeconomic History    Marital status: Unknown     Spouse name: Not on file    Number of children: Not on file    Years of education: Not on file    Highest education level: Not on file   Occupational History    Not on file   Tobacco Use    Smoking status: Never Smoker    Smokeless tobacco: Never Used   Vaping Use    Vaping Use: Never used   Substance and Sexual Activity    Alcohol use: Never    Drug use: Never    Sexual activity: Not on file   Other Topics Concern    Not on file   Social History Narrative    Not on file     Social Determinants of Health     Financial Resource Strain:     Difficulty of Paying Living Expenses: Not on file   Food Insecurity:     Worried About [address] [employer] in the Last Year: Not on file    Cory of Food in the Last Year: Not on file   Transportation Needs:     Lack of Transportation (Medical): Not on file    Lack of Transportation (Non-Medical): Not on file   Physical Activity:     Days of Exercise per Week: Not on file    Minutes of Exercise per Session: Not on file   Stress:     Feeling of Stress : Not on file   Social Connections:     Frequency of Communication with Friends and Family: Not on file    Frequency of Social Gatherings with Friends and Family: Not on file    Attends Restoration Services: Not on file    Active Member of [address] [place] [address] [employer] or Organizations: Not on file    Attends Club or Organization Meetings: Not on file    Marital Status: Not on file   Intimate Partner Violence:     Fear of Current or Ex-Partner: Not on file    Emotionally Abused: Not on file    Physically Abused: Not on file    Sexually Abused: Not on file   Housing Stability:     Unable to Pay for Housing in the Last Year: Not on file    Number of [place] in the Last Year: Not on file    Unstable Housing in the Last Year: Not on file     No current facility-administered medications for this encounter.      Current Outpatient Medications   Medication Sig Dispense Refill    tamsulosin (FLOMAX) 0.4 MG capsule Take 1 capsule by mouth daily 7 capsule 0    ketorolac (TORADOL) 10 MG tablet Take 1 tablet by mouth every 8 hours as needed (flank pain) 12 tablet 0    oxyCODONE-acetaminophen (PERCOCET) 5-325 MG per tablet Take 1 tablet by mouth every 8 hours as needed for Pain for up to 3 days. Intended supply: 3 days. Take lowest dose possible to manage pain 5 tablet 0    ondansetron (ZOFRAN ODT) 4 MG disintegrating tablet Take 1 tablet by mouth every 8 hours as needed for Nausea or Vomiting 12 tablet 0    Polysaccharide Iron Complex (PROFE PO) Take 200 mg by mouth daily      propranolol (INDERAL) 80 MG tablet Take 80 mg by mouth Once in dialysis Indications: Patient unsure of medication name but thinks this is close to what he is taking      Multiple Vitamins-Minerals (THERAPEUTIC MULTIVITAMIN-MINERALS) tablet Take 1 tablet by mouth daily      Ascorbic Acid (VITAMIN C) 250 MG tablet Take 1,000 mg by mouth daily      albuterol (ACCUNEB) 1.25 MG/3ML nebulizer solution Inhale 1 ampule into the lungs every 4 hours as needed for Wheezing      Omega 3 1200 MG CAPS Take by mouth 2 caps       Allergies   Allergen Reactions    Grass Pollen(K-O-R-T-Swt Jagdish) Other (See Comments)     Soybean dust, corn dust, and wheat dust.   Ragweed  Pollen        REVIEW OF SYSTEMS  10 systems reviewed, pertinent positives per HPI otherwise noted to be negative. PHYSICAL EXAM  BP (!) 140/82   Pulse 83   Temp 98.6 °F (37 °C) (Oral)   Resp 16   Ht 6' 2\" (1.88 m)   Wt 253 lb (114.8 kg)   SpO2 98%   BMI 32.48 kg/m²    Physical exam:  General appearance: awake and cooperative. In pain distress. Non toxic appearing. Skin: Warm and dry. No rashes or lesions. HENT: Normocephalic. Atraumatic. Mucus membranes are moist.   Neck: supple  Eyes: CHRISTOPHE. EOM intact. Heart: RRR. No murmurs. Lungs: Respirations unlabored. CTAB. No wheezes, rales, or rhonchi. Good air exchange  Abdomen: No tenderness. Soft. Non distended. No peritoneal signs. Musculoskeletal: No extremity edema. Compartments soft. No deformity. No tenderness in the extremities. All extremities neurovascularly intact. Radial, Dp, and PT pulses +2/4 bilaterally  Neurological: Alert and oriented. No focal deficits. No aphasia or dysarthria. No gait ataxia. Psychiatric: Normal mood and affect. LABS  I have reviewed all labs for this visit.    Results for orders placed or performed during the hospital encounter of 01/20/22   CBC Auto Differential   Result Value Ref Range    WBC 7.3 4.0 - 11.0 K/uL    RBC 5.32 4.20 - 5.90 M/uL    Hemoglobin 15.2 13.5 - 17.5 g/dL    Hematocrit 44.7 40.5 - 52.5 %    MCV 84.1 80.0 - 100.0 fL    MCH 28.6 26.0 - 34.0 pg    MCHC 34.0 31.0 - 36.0 g/dL    RDW 14.4 12.4 - 15.4 %    Platelets 030 574 - 582 K/uL    MPV 6.9 5.0 - 10.5 fL    Neutrophils % 72.3 %    Lymphocytes % 19.8 %    Monocytes % 6.1 %    Eosinophils % 1.3 %    Basophils % 0.5 %    Neutrophils Absolute 5.3 1.7 - 7.7 K/uL    Lymphocytes Absolute 1.4 1.0 - 5.1 K/uL    Monocytes Absolute 0.4 0.0 - 1.3 K/uL    Eosinophils Absolute 0.1 0.0 - 0.6 K/uL    Basophils Absolute 0.0 0.0 - 0.2 K/uL   Comprehensive Metabolic Panel   Result Value Ref Range    Sodium 139 136 - 145 mmol/L    Potassium 4.3 3.5 - 5.1 mmol/L    Chloride 103 99 - 110 mmol/L    CO2 25 21 - 32 mmol/L    Anion Gap 11 3 - 16    Glucose 147 (H) 70 - 99 mg/dL    BUN 16 7 - 20 mg/dL    CREATININE 1.2 0.9 - 1.3 mg/dL    GFR Non-African American >60 >60    GFR African American >60 >60    Calcium 9.8 8.3 - 10.6 mg/dL    Total Protein 7.4 6.4 - 8.2 g/dL    Albumin 4.4 3.4 - 5.0 g/dL    Albumin/Globulin Ratio 1.5 1.1 - 2.2    Total Bilirubin 0.8 0.0 - 1.0 mg/dL    Alkaline Phosphatase 68 40 - 129 U/L    ALT 30 10 - 40 U/L    AST 29 15 - 37 U/L   Urinalysis Reflex to Culture    Specimen: Urine, clean catch   Result Value Ref Range    Color, UA Yellow Straw/Yellow    Clarity, UA Clear Clear    Glucose, seen along the anterior margin of the right kidney, typically cyst. Liver is fatty. Focal fatty sparing about the gallbladder fossa. 8 mm low-density lesion within the left hepatic lobe, too small to characterize. Calcified granulomas within the spleen. Splenule adjacent to the spleen. No pancreatic ductal dilatation or stranding. Adrenal glands are within normal limits. GI/Bowel: Loops of small and large bowel are nondilated. Appendix is within normal limits in caliber. Pelvis: Streak artifact is demonstrated from fixation hardware along the pelvis and screws within the proximal right femur. Pelvic phleboliths. Left UVJ calculus as above. No inguinal adenopathy. Peritoneum/Retroperitoneum: No free air. Bones/Soft Tissues: Fixation hardware within the pelvis along the pubic bones and within the proximal right femur. 5 mm calculus at the left UVJ with mild left hydronephrosis, hydroureter, as well as perinephric stranding and fluid. Additional bilateral nonobstructing nephrolithiasis. Fatty liver. ED COURSE/MDM  Patient seen and evaluated. Old records reviewed. Labs and imaging reviewed and results discussed with patient. This is a 64-year-old male presenting for evaluation of left flank pain. He is initially hypertensive on arrival this improves after pain control. He is not tachycardic or febrile. He appears to be in pain distress but is nontoxic-appearing overall. I suspect kidney stone based on his presentation he was given Toradol with good improvement of his pain. His UA is negative for hematuria or infection. Otherwise he does not have an BRIEN. He is found to have a 5 mm stone at left UPJ with hydronephrosis present. Given the fact that his pain is controlled, he will be discharged home with oral importance of regular sleep and strainer as well as prescription for Flomax.   I did consult urology and spoke with the nurse practitioner who stated the patient could follow-up very closely in the office. I did discuss plan of care for home with the patient. We also discussed return precautions back to the ED and he voices understanding. During the patient's ED course, the patient was given:  Medications   ketorolac (TORADOL) injection 15 mg (15 mg IntraVENous Given 1/20/22 1711)   0.9 % sodium chloride bolus (0 mLs IntraVENous Stopped 1/20/22 4651)        CLINICAL IMPRESSION  1. Kidney stone    2. Renal colic        Blood pressure (!) 140/82, pulse 83, temperature 98.6 °F (37 °C), temperature source Oral, resp. rate 16, height 6' 2\" (1.88 m), weight 253 lb (114.8 kg), SpO2 98 %. Patient was given scripts for the following medications. I counseled patient how to take these medications. Discharge Medication List as of 1/20/2022 10:05 AM      START taking these medications    Details   tamsulosin (FLOMAX) 0.4 MG capsule Take 1 capsule by mouth daily, Disp-7 capsule, R-0Print      ketorolac (TORADOL) 10 MG tablet Take 1 tablet by mouth every 8 hours as needed (flank pain), Disp-12 tablet, R-0Print      oxyCODONE-acetaminophen (PERCOCET) 5-325 MG per tablet Take 1 tablet by mouth every 8 hours as needed for Pain for up to 3 days. Intended supply: 3 days. Take lowest dose possible to manage pain, Disp-5 tablet, R-0Print      ondansetron (ZOFRAN ODT) 4 MG disintegrating tablet Take 1 tablet by mouth every 8 hours as needed for Nausea or Vomiting, Disp-12 tablet, R-0Print             Follow-up with:  Jeny Rosenberg MD  [address] [place] [address] [phone]    Call in 1 day        DISCLAIMER: This chart was created using Dragon dictation software. Efforts were made by me to ensure accuracy, however some errors may be present due to limitations of this technology and occasionally words are not transcribed correctly.        Marah RamseyInfirmary [facility]  01/21/22 2625

## (undated) DEVICE — ELECTRODE PT RET AD L9FT HI MOIST COND ADH HYDRGEL CORDED

## (undated) DEVICE — TRAY PREP DRY W/ PREM GLV 2 APPL 6 SPNG 2 UNDPD 1 OVERWRAP

## (undated) DEVICE — ENDO CARRY-ON PROCEDURE KIT INCLUDES SUCTION TUBING, LUBRICANT, GAUZE, BIOHAZARD STICKER, TRANSPORT PAD AND INTERCEPT BEDSIDE KIT.: Brand: ENDO CARRY-ON PROCEDURE KIT

## (undated) DEVICE — PLATE ES AD W 9FT CRD 2

## (undated) DEVICE — SURGIFOAM SPNG SZ 100

## (undated) DEVICE — PENCIL ES ULT VAC W TELSCP NOSE EZ CLN BLDE 10FT

## (undated) DEVICE — GOWN,SIRUS,POLYRNF,BRTHSLV,XLN/XXL,18/CS: Brand: MEDLINE

## (undated) DEVICE — E-Z CLEAN, NON-STICK, PTFE COATED, ELECTROSURGICAL BLADE ELECTRODE, 2.5 INCH (6.35 CM): Brand: EZ CLEAN

## (undated) DEVICE — ST FLUFF LG 1 PLY: Brand: DEROYAL

## (undated) DEVICE — UNDERPANTS INCONT XL 45-70IN KNIT SEAMLESS DSGN COLOR-CODED

## (undated) DEVICE — SUTURE CHROMIC GUT SZ 3-0 L27IN ABSRB BRN L26MM SH 1/2 CIR G122H

## (undated) DEVICE — CONMED SCOPE SAVER BITE BLOCK, 20X27 MM: Brand: SCOPE SAVER

## (undated) DEVICE — GLOVE ORANGE PI 8 1/2   MSG9085

## (undated) DEVICE — STANDARD HYPODERMIC NEEDLE,POLYPROPYLENE HUB: Brand: MONOJECT

## (undated) DEVICE — TRAP POLYP ETRAP

## (undated) DEVICE — SPONGE,LAP,18"X18",DLX,XR,ST,5/PK,40/PK: Brand: MEDLINE

## (undated) DEVICE — COVER LT HNDL BLU PLAS

## (undated) DEVICE — CANNULA,OXY,ADULT,SUPERSOFT,W/7'TUB,SC: Brand: MEDLINE INDUSTRIES, INC.

## (undated) DEVICE — SURGICAL SET UP - SURE SET: Brand: MEDLINE INDUSTRIES, INC.

## (undated) DEVICE — JEWISH HOSPITAL TURNOVER KIT: Brand: MEDLINE INDUSTRIES, INC.

## (undated) DEVICE — SURE SET-DOUBLE BASIN-LF: Brand: MEDLINE INDUSTRIES, INC.

## (undated) DEVICE — PAD,ABDOMINAL,5"X9",ST,LF,25/BX: Brand: MEDLINE INDUSTRIES, INC.

## (undated) DEVICE — PACK LAP IV REINF TBL CVR ADH UTIL UNDERBUTTOCK DRP W CUF

## (undated) DEVICE — Device

## (undated) DEVICE — GARMENT,MEDLINE,DVT,INT,CALF,MED, GEN2: Brand: MEDLINE

## (undated) DEVICE — HEMOSPRAY ENDOSCOPIC HEMOSTAT: Brand: HEMOSPRAY

## (undated) DEVICE — SEALER LAP SM L18.8CM OPN JAW HAND/FOOT SWCH FORCETRIAD

## (undated) DEVICE — GLOVE ORANGE PI 8   MSG9080

## (undated) DEVICE — ELECTRODE,ECG,STRESS,FOAM,3PK: Brand: MEDLINE